# Patient Record
Sex: MALE | Race: WHITE | NOT HISPANIC OR LATINO | Employment: OTHER | URBAN - METROPOLITAN AREA
[De-identification: names, ages, dates, MRNs, and addresses within clinical notes are randomized per-mention and may not be internally consistent; named-entity substitution may affect disease eponyms.]

---

## 2023-05-10 ENCOUNTER — LAB (OUTPATIENT)
Dept: LAB | Facility: LAB | Age: 88
End: 2023-05-10
Payer: MEDICARE

## 2023-05-10 ENCOUNTER — OFFICE VISIT (OUTPATIENT)
Dept: PRIMARY CARE | Facility: CLINIC | Age: 88
End: 2023-05-10
Payer: MEDICARE

## 2023-05-10 VITALS
OXYGEN SATURATION: 94 % | SYSTOLIC BLOOD PRESSURE: 112 MMHG | DIASTOLIC BLOOD PRESSURE: 48 MMHG | HEIGHT: 67 IN | HEART RATE: 67 BPM | WEIGHT: 165.5 LBS | BODY MASS INDEX: 25.98 KG/M2

## 2023-05-10 DIAGNOSIS — R53.83 FATIGUE, UNSPECIFIED TYPE: ICD-10-CM

## 2023-05-10 DIAGNOSIS — D64.9 ANEMIA, UNSPECIFIED TYPE: ICD-10-CM

## 2023-05-10 DIAGNOSIS — D69.6 THROMBOCYTOPENIA (CMS-HCC): ICD-10-CM

## 2023-05-10 DIAGNOSIS — H93.8X1 SENSATION OF PLUGGED EAR ON RIGHT SIDE: ICD-10-CM

## 2023-05-10 DIAGNOSIS — N18.31 STAGE 3A CHRONIC KIDNEY DISEASE (MULTI): ICD-10-CM

## 2023-05-10 DIAGNOSIS — R06.09 DOE (DYSPNEA ON EXERTION): Primary | ICD-10-CM

## 2023-05-10 DIAGNOSIS — R06.09 DOE (DYSPNEA ON EXERTION): ICD-10-CM

## 2023-05-10 DIAGNOSIS — R09.89 WIDENED PULSE PRESSURE: ICD-10-CM

## 2023-05-10 PROBLEM — R06.02 SOB (SHORTNESS OF BREATH) ON EXERTION: Status: ACTIVE | Noted: 2023-05-10

## 2023-05-10 PROBLEM — J44.9 CHRONIC OBSTRUCTIVE LUNG DISEASE (MULTI): Status: ACTIVE | Noted: 2022-08-01

## 2023-05-10 PROBLEM — R91.1 LUNG NODULE: Status: ACTIVE | Noted: 2023-05-10

## 2023-05-10 PROBLEM — N18.30 STAGE 3 CHRONIC KIDNEY DISEASE (MULTI): Status: ACTIVE | Noted: 2022-09-07

## 2023-05-10 PROBLEM — E78.00 HYPERCHOLESTEROLEMIA: Status: ACTIVE | Noted: 2022-08-01

## 2023-05-10 PROBLEM — I95.9 LOW BLOOD PRESSURE: Status: ACTIVE | Noted: 2022-09-07

## 2023-05-10 LAB
ERYTHROCYTE DISTRIBUTION WIDTH (RATIO) BY AUTOMATED COUNT: 15.3 % (ref 11.5–14.5)
ERYTHROCYTE MEAN CORPUSCULAR HEMOGLOBIN CONCENTRATION (G/DL) BY AUTOMATED: 30.6 G/DL (ref 32–36)
ERYTHROCYTE MEAN CORPUSCULAR VOLUME (FL) BY AUTOMATED COUNT: 96 FL (ref 80–100)
ERYTHROCYTES (10*6/UL) IN BLOOD BY AUTOMATED COUNT: 3.75 X10E12/L (ref 4.5–5.9)
HEMATOCRIT (%) IN BLOOD BY AUTOMATED COUNT: 35.9 % (ref 41–52)
HEMOGLOBIN (G/DL) IN BLOOD: 11 G/DL (ref 13.5–17.5)
LEUKOCYTES (10*3/UL) IN BLOOD BY AUTOMATED COUNT: 15.9 X10E9/L (ref 4.4–11.3)
NATRIURETIC PEPTIDE B (PG/ML) IN SER/PLAS: 254 PG/ML (ref 0–99)
NRBC (PER 100 WBCS) BY AUTOMATED COUNT: 0.1 /100 WBC (ref 0–0)
PLATELETS (10*3/UL) IN BLOOD AUTOMATED COUNT: 122 X10E9/L (ref 150–450)
POC ALBUMIN /CREATININE RATIO MANUALLY ENTERED: ABNORMAL UG/MG CREAT
POC URINE ALBUMIN: 80 MG/L
POC URINE CREATININE: 100 MG/DL
THYROTROPIN (MIU/L) IN SER/PLAS BY DETECTION LIMIT <= 0.05 MIU/L: 4.15 MIU/L (ref 0.44–3.98)

## 2023-05-10 PROCEDURE — 99203 OFFICE O/P NEW LOW 30 MIN: CPT | Performed by: FAMILY MEDICINE

## 2023-05-10 PROCEDURE — 1036F TOBACCO NON-USER: CPT | Performed by: FAMILY MEDICINE

## 2023-05-10 PROCEDURE — 83550 IRON BINDING TEST: CPT

## 2023-05-10 PROCEDURE — 82607 VITAMIN B-12: CPT

## 2023-05-10 PROCEDURE — 83540 ASSAY OF IRON: CPT

## 2023-05-10 PROCEDURE — 83735 ASSAY OF MAGNESIUM: CPT

## 2023-05-10 PROCEDURE — 84439 ASSAY OF FREE THYROXINE: CPT

## 2023-05-10 PROCEDURE — 83880 ASSAY OF NATRIURETIC PEPTIDE: CPT

## 2023-05-10 PROCEDURE — 85027 COMPLETE CBC AUTOMATED: CPT

## 2023-05-10 PROCEDURE — 82728 ASSAY OF FERRITIN: CPT

## 2023-05-10 PROCEDURE — 84443 ASSAY THYROID STIM HORMONE: CPT

## 2023-05-10 PROCEDURE — 93000 ELECTROCARDIOGRAM COMPLETE: CPT | Performed by: FAMILY MEDICINE

## 2023-05-10 PROCEDURE — 1159F MED LIST DOCD IN RCRD: CPT | Performed by: FAMILY MEDICINE

## 2023-05-10 PROCEDURE — 36415 COLL VENOUS BLD VENIPUNCTURE: CPT

## 2023-05-10 PROCEDURE — 82044 UR ALBUMIN SEMIQUANTITATIVE: CPT | Performed by: FAMILY MEDICINE

## 2023-05-10 PROCEDURE — 80053 COMPREHEN METABOLIC PANEL: CPT

## 2023-05-10 RX ORDER — FERROUS SULFATE 325(65) MG
65 TABLET ORAL
COMMUNITY
End: 2023-11-21 | Stop reason: ALTCHOICE

## 2023-05-10 RX ORDER — TAMSULOSIN HYDROCHLORIDE 0.4 MG/1
CAPSULE ORAL
COMMUNITY
Start: 2021-09-28

## 2023-05-10 RX ORDER — MV-MN/OM3/DHA/EPA/FISH/LUT/ZEA 250-5-1 MG
CAPSULE ORAL
COMMUNITY
Start: 2021-09-28

## 2023-05-10 RX ORDER — VIT C/E/ZN/COPPR/LUTEIN/ZEAXAN 250MG-90MG
CAPSULE ORAL
COMMUNITY
Start: 2021-09-28 | End: 2023-11-21 | Stop reason: ALTCHOICE

## 2023-05-10 RX ORDER — YEAST,DRIED (S. CEREVISIAE)
POWDER (GRAM) ORAL
COMMUNITY
Start: 2021-09-28

## 2023-05-10 RX ORDER — SIMVASTATIN 20 MG/1
TABLET, FILM COATED ORAL
COMMUNITY
Start: 2021-09-28

## 2023-05-10 ASSESSMENT — ENCOUNTER SYMPTOMS
WHEEZING: 0
SHORTNESS OF BREATH: 1
UNEXPECTED WEIGHT CHANGE: 0
DIFFICULTY URINATING: 0
DIARRHEA: 0
BLOOD IN STOOL: 0
DYSURIA: 0
NUMBNESS: 0
TROUBLE SWALLOWING: 0
VOMITING: 0
ABDOMINAL PAIN: 0
FEVER: 0
LIGHT-HEADEDNESS: 0
COUGH: 0
DIZZINESS: 0
CHILLS: 0
NAUSEA: 0
CONFUSION: 0
WEAKNESS: 0

## 2023-05-10 NOTE — PROGRESS NOTES
"Subjective   Patient ID: Abdias Mike is a 89 y.o. male who presents for Weakness, Gen and Shortness of Breath.  HPI  C/o generalized weakness since knee surgery July 2022. Also c/o FRIAS, going on \"quite a while.\" Nothing's changed for quite a while.    Quit smoking 40yoa, smoked for 20 years.    [facility] Last summer started seeing Dr. Moses.     Saw a cardiologist 10y ago, RBBB?    Previously took Midodrine to try to increase DBP, just decided to stop it on his own.    Has an appointment with hematology on 8-.    C/o right ear clogging occasionally. Feels fine at the moment. H/o clogging, sometimes triggered by swallowing. Relieved by flushing out the ear (wax).       Review of Systems   Constitutional:  Negative for chills, fever and unexpected weight change.   HENT:  Negative for ear pain and trouble swallowing.    Respiratory:  Positive for shortness of breath (on exertion). Negative for cough and wheezing.    Cardiovascular:  Negative for chest pain.   Gastrointestinal:  Negative for abdominal pain, blood in stool, diarrhea, nausea and vomiting.   Genitourinary:  Negative for difficulty urinating and dysuria.   Skin:  Negative for rash.   Neurological:  Negative for dizziness, syncope, weakness, light-headedness and numbness.   Psychiatric/Behavioral:  Negative for behavioral problems and confusion.          Objective   BP (!) 112/48 Comment: maual  Pulse 67   Ht 1.702 m (5' 7\")   Wt 75.1 kg (165 lb 8 oz)   SpO2 94%   BMI 25.92 kg/m²     Physical Exam  Vitals and nursing note reviewed.   Constitutional:       General: He is not in acute distress.     Appearance: He is not diaphoretic.   HENT:      Head: Normocephalic and atraumatic.      Right Ear: Tympanic membrane, ear canal and external ear normal. There is no impacted cerumen.      Left Ear: Tympanic membrane, ear canal and external ear normal. There is no impacted cerumen.   Eyes:      General: No scleral icterus.     Conjunctiva/sclera: " Conjunctivae normal.   Neck:      Thyroid: No thyroid mass, thyromegaly or thyroid tenderness.      Vascular: No carotid bruit.      Trachea: No tracheal deviation.   Cardiovascular:      Rate and Rhythm: Normal rate and regular rhythm.      Heart sounds: Normal heart sounds.   Pulmonary:      Effort: Pulmonary effort is normal.      Breath sounds: Normal breath sounds. No wheezing, rhonchi or rales.   Musculoskeletal:      Cervical back: Normal range of motion and neck supple. No rigidity.   Lymphadenopathy:      Cervical: No cervical adenopathy.   Skin:     General: Skin is warm and dry.   Neurological:      General: No focal deficit present.      Mental Status: He is alert. Mental status is at baseline.   Psychiatric:         Mood and Affect: Mood normal.         Thought Content: Thought content normal.         Assessment/Plan   Problem List Items Addressed This Visit          Nervous    Sensation of plugged ear on right side     Asymptomatic currently. Likely due to cerumen.            Respiratory    FRIAS (dyspnea on exertion) - Primary     Check echocardiogram, blood counts, etc. Follow-up with cardiology.         Relevant Orders    Transthoracic echo (TTE) complete    TSH with reflex to Free T4 if abnormal    Magnesium    B-type natriuretic peptide    Referral to Cardiology    ECG 12 lead (Clinic Performed) (Completed)       Circulatory    Widened pulse pressure    Relevant Orders    Transthoracic echo (TTE) complete    Referral to Cardiology    ECG 12 lead (Clinic Performed) (Completed)       Genitourinary    Stage 3 chronic kidney disease (CMS/HCC)    Relevant Orders    Comprehensive Metabolic Panel    POCT microalbumin manually resulted (Completed)       Hematologic    Anemia     Recheck. Continue follow-up with hematology.         Relevant Orders    CBC    Vitamin B12    Iron and TIBC    Ferritin    Thrombocytopenia (CMS/HCC)     Recheck. Continue follow-up with hematology.            Other    Fatigue      Recheck CBC.         Relevant Orders    Comprehensive Metabolic Panel    CBC    TSH with reflex to Free T4 if abnormal    Referral to Cardiology

## 2023-05-11 ENCOUNTER — TELEPHONE (OUTPATIENT)
Dept: PRIMARY CARE | Facility: CLINIC | Age: 88
End: 2023-05-11
Payer: MEDICARE

## 2023-05-11 LAB
ALANINE AMINOTRANSFERASE (SGPT) (U/L) IN SER/PLAS: 12 U/L (ref 10–52)
ALBUMIN (G/DL) IN SER/PLAS: 3.9 G/DL (ref 3.4–5)
ALKALINE PHOSPHATASE (U/L) IN SER/PLAS: 72 U/L (ref 33–136)
ANION GAP IN SER/PLAS: 16 MMOL/L (ref 10–20)
ASPARTATE AMINOTRANSFERASE (SGOT) (U/L) IN SER/PLAS: 21 U/L (ref 9–39)
BILIRUBIN TOTAL (MG/DL) IN SER/PLAS: 0.5 MG/DL (ref 0–1.2)
CALCIUM (MG/DL) IN SER/PLAS: 9.3 MG/DL (ref 8.6–10.3)
CARBON DIOXIDE, TOTAL (MMOL/L) IN SER/PLAS: 25 MMOL/L (ref 21–32)
CHLORIDE (MMOL/L) IN SER/PLAS: 102 MMOL/L (ref 98–107)
COBALAMIN (VITAMIN B12) (PG/ML) IN SER/PLAS: 1338 PG/ML (ref 211–911)
CREATININE (MG/DL) IN SER/PLAS: 1.37 MG/DL (ref 0.5–1.3)
FERRITIN (UG/LL) IN SER/PLAS: 864 UG/L (ref 20–300)
GFR MALE: 49 ML/MIN/1.73M2
GLUCOSE (MG/DL) IN SER/PLAS: 92 MG/DL (ref 74–99)
IRON (UG/DL) IN SER/PLAS: 54 UG/DL (ref 35–150)
IRON BINDING CAPACITY (UG/DL) IN SER/PLAS: 257 UG/DL (ref 240–445)
IRON SATURATION (%) IN SER/PLAS: 21 % (ref 25–45)
MAGNESIUM (MG/DL) IN SER/PLAS: 2.38 MG/DL (ref 1.6–2.4)
POTASSIUM (MMOL/L) IN SER/PLAS: 4.1 MMOL/L (ref 3.5–5.3)
PROTEIN TOTAL: 7.1 G/DL (ref 6.4–8.2)
SODIUM (MMOL/L) IN SER/PLAS: 139 MMOL/L (ref 136–145)
THYROXINE (T4) FREE (NG/DL) IN SER/PLAS: 0.92 NG/DL (ref 0.61–1.12)
UREA NITROGEN (MG/DL) IN SER/PLAS: 28 MG/DL (ref 6–23)

## 2023-05-11 NOTE — TELEPHONE ENCOUNTER
Result Communication    Resulted Orders   POCT microalbumin manually resulted   Result Value Ref Range    POC Urine Albumin 80 No Reference Range Established mg/L      Comment:      100    POC Urine Creatinine 100 No Reference Range Established mg/dl    POC ALBUMIN /CREATININE RATIO MANUALLY ENTERED 30 - 300 (A) <30 ug/mg Creat       12:14 PM      Results were successfully communicated with the patient and they acknowledged their understanding. Pt has my chart and has viewed results

## 2023-05-11 NOTE — TELEPHONE ENCOUNTER
----- Message from Rafael Todd DO sent at 5/10/2023  4:30 PM EDT -----  Please make sure patient is aware of the comments or MyChart message.

## 2023-05-21 DIAGNOSIS — N18.31 STAGE 3A CHRONIC KIDNEY DISEASE (MULTI): ICD-10-CM

## 2023-05-21 DIAGNOSIS — D72.829 LEUKOCYTOSIS, UNSPECIFIED TYPE: ICD-10-CM

## 2023-05-21 DIAGNOSIS — R79.89 TSH ELEVATION: Primary | ICD-10-CM

## 2023-05-21 DIAGNOSIS — D50.9 IRON DEFICIENCY ANEMIA, UNSPECIFIED IRON DEFICIENCY ANEMIA TYPE: ICD-10-CM

## 2023-05-21 DIAGNOSIS — D72.821 MONOCYTOSIS: ICD-10-CM

## 2023-05-21 DIAGNOSIS — E78.00 HYPERCHOLESTEROLEMIA: ICD-10-CM

## 2023-05-22 ENCOUNTER — TELEPHONE (OUTPATIENT)
Dept: PRIMARY CARE | Facility: CLINIC | Age: 88
End: 2023-05-22
Payer: MEDICARE

## 2023-05-22 DIAGNOSIS — D64.9 ANEMIA, UNSPECIFIED TYPE: ICD-10-CM

## 2023-05-22 DIAGNOSIS — D72.821 MONOCYTOSIS: ICD-10-CM

## 2023-05-22 DIAGNOSIS — D69.6 THROMBOCYTOPENIA (CMS-HCC): Primary | ICD-10-CM

## 2023-05-22 NOTE — TELEPHONE ENCOUNTER
----- Message from Rafael Todd DO sent at 5/21/2023  1:14 PM EDT -----  Please make sure patient is aware of the comments or MyChart message.

## 2023-05-22 NOTE — TELEPHONE ENCOUNTER
Result Communication    Resulted Orders   Comprehensive Metabolic Panel   Result Value Ref Range    Glucose 92 74 - 99 mg/dL    Sodium 139 136 - 145 mmol/L    Potassium 4.1 3.5 - 5.3 mmol/L    Chloride 102 98 - 107 mmol/L    Bicarbonate 25 21 - 32 mmol/L    Anion Gap 16 10 - 20 mmol/L    Urea Nitrogen 28 (H) 6 - 23 mg/dL    Creatinine 1.37 (H) 0.50 - 1.30 mg/dL    GFR MALE 49 (A) >90 mL/min/1.73m2      Comment:       CALCULATIONS OF ESTIMATED GFR ARE PERFORMED   USING THE 2021 CKD-EPI STUDY REFIT EQUATION   WITHOUT THE RACE VARIABLE FOR THE IDMS-TRACEABLE   CREATININE METHODS.    https://jasn.asnjournals.org/content/early/2021/09/22/ASN.6521426023    Calcium 9.3 8.6 - 10.3 mg/dL    Albumin 3.9 3.4 - 5.0 g/dL    Alkaline Phosphatase 72 33 - 136 U/L    Total Protein 7.1 6.4 - 8.2 g/dL    AST 21 9 - 39 U/L    Total Bilirubin 0.5 0.0 - 1.2 mg/dL    ALT (SGPT) 12 10 - 52 U/L      Comment:       Patients treated with Sulfasalazine may generate    falsely decreased results for ALT.   CBC   Result Value Ref Range    WBC 15.9 (H) 4.4 - 11.3 x10E9/L    nRBC 0.1 0.0 - 0.0 /100 WBC    RBC 3.75 (L) 4.50 - 5.90 x10E12/L    Hemoglobin 11.0 (L) 13.5 - 17.5 g/dL    Hematocrit 35.9 (L) 41.0 - 52.0 %    MCV 96 80 - 100 fL    MCHC 30.6 (L) 32.0 - 36.0 g/dL    Platelets 122 (L) 150 - 450 x10E9/L    RDW 15.3 (H) 11.5 - 14.5 %   TSH with reflex to Free T4 if abnormal   Result Value Ref Range    TSH 4.15 (H) 0.44 - 3.98 mIU/L      Comment:       TSH testing is performed using different testing    methodology at [facility] than at other    [facility] hospitals. Direct result comparisons should    only be made within the same method.   Magnesium   Result Value Ref Range    Magnesium 2.38 1.60 - 2.40 mg/dL   B-type natriuretic peptide   Result Value Ref Range     (H) 0 - 99 pg/mL      Comment:      .  <100 pg/mL - Heart failure unlikely  100-299 pg/mL - Intermediate probability of acute heart  .               failure  exacerbation. Correlate with clinical  .               context and patient history.    >=300 pg/mL - Heart Failure likely. Correlate with clinical  .               context and patient history.  BNP testing is performed using different testing   methodology at [facility] than at other   [facility]. Direct result comparisons should   only be made within the same method.   Vitamin B12   Result Value Ref Range    Vitamin B-12 1338 (H) 211 - 911 pg/mL   Iron and TIBC   Result Value Ref Range    Iron 54 35 - 150 ug/dL    TIBC 257 240 - 445 ug/dL    Iron Saturation 21 (L) 25 - 45 %   Ferritin   Result Value Ref Range    Ferritin 864 (H) 20 - 300 ug/L   Thyroxine, Free   Result Value Ref Range    Free T4 0.92 0.61 - 1.12 ng/dL      Comment:       Thyroxine Free testing is performed using different testing    methodology at [facility] than at other    [facility]. Direct result comparisons should    only be made within the same method.  .   Biotin can cause falsely elevated free T4 results. Patients   taking a Biotin dose of up to 10 mg/day should refrain from   taking Biotin for 24 hours before sample collection. Patient   taking a Biotin dose of >10 mg/day should consult with their   physician or the laboratory before the blood draw.       2:00 PM      Results were successfully communicated with the patient and they acknowledged their understanding.  Result Communication    Resulted Orders   Comprehensive Metabolic Panel   Result Value Ref Range    Glucose 92 74 - 99 mg/dL    Sodium 139 136 - 145 mmol/L    Potassium 4.1 3.5 - 5.3 mmol/L    Chloride 102 98 - 107 mmol/L    Bicarbonate 25 21 - 32 mmol/L    Anion Gap 16 10 - 20 mmol/L    Urea Nitrogen 28 (H) 6 - 23 mg/dL    Creatinine 1.37 (H) 0.50 - 1.30 mg/dL    GFR MALE 49 (A) >90 mL/min/1.73m2      Comment:       CALCULATIONS OF ESTIMATED GFR ARE PERFORMED   USING THE 2021 CKD-EPI STUDY REFIT EQUATION   WITHOUT THE RACE VARIABLE FOR  THE IDMS-TRACEABLE   CREATININE METHODS.    https://jasn.asnjournals.org/content/early/2021/09/22/ASN.5424850621    Calcium 9.3 8.6 - 10.3 mg/dL    Albumin 3.9 3.4 - 5.0 g/dL    Alkaline Phosphatase 72 33 - 136 U/L    Total Protein 7.1 6.4 - 8.2 g/dL    AST 21 9 - 39 U/L    Total Bilirubin 0.5 0.0 - 1.2 mg/dL    ALT (SGPT) 12 10 - 52 U/L      Comment:       Patients treated with Sulfasalazine may generate    falsely decreased results for ALT.   CBC   Result Value Ref Range    WBC 15.9 (H) 4.4 - 11.3 x10E9/L    nRBC 0.1 0.0 - 0.0 /100 WBC    RBC 3.75 (L) 4.50 - 5.90 x10E12/L    Hemoglobin 11.0 (L) 13.5 - 17.5 g/dL    Hematocrit 35.9 (L) 41.0 - 52.0 %    MCV 96 80 - 100 fL    MCHC 30.6 (L) 32.0 - 36.0 g/dL    Platelets 122 (L) 150 - 450 x10E9/L    RDW 15.3 (H) 11.5 - 14.5 %   TSH with reflex to Free T4 if abnormal   Result Value Ref Range    TSH 4.15 (H) 0.44 - 3.98 mIU/L      Comment:       TSH testing is performed using different testing    methodology at [facility] than at other    [facility]. Direct result comparisons should    only be made within the same method.   Magnesium   Result Value Ref Range    Magnesium 2.38 1.60 - 2.40 mg/dL   B-type natriuretic peptide   Result Value Ref Range     (H) 0 - 99 pg/mL      Comment:      .  <100 pg/mL - Heart failure unlikely  100-299 pg/mL - Intermediate probability of acute heart  .               failure exacerbation. Correlate with clinical  .               context and patient history.    >=300 pg/mL - Heart Failure likely. Correlate with clinical  .               context and patient history.  BNP testing is performed using different testing   methodology at [facility] than at other   system hospitals. Direct result comparisons should   only be made within the same method.   Vitamin B12   Result Value Ref Range    Vitamin B-12 1338 (H) 211 - 911 pg/mL   Iron and TIBC   Result Value Ref Range    Iron 54 35 - 150 ug/dL    TIBC 257  240 - 445 ug/dL    Iron Saturation 21 (L) 25 - 45 %   Ferritin   Result Value Ref Range    Ferritin 864 (H) 20 - 300 ug/L   Thyroxine, Free   Result Value Ref Range    Free T4 0.92 0.61 - 1.12 ng/dL      Comment:       Thyroxine Free testing is performed using different testing    methodology at [facility] than at other    [facility]. Direct result comparisons should    only be made within the same method.  .   Biotin can cause falsely elevated free T4 results. Patients   taking a Biotin dose of up to 10 mg/day should refrain from   taking Biotin for 24 hours before sample collection. Patient   taking a Biotin dose of >10 mg/day should consult with their   physician or the laboratory before the blood draw.       2:00 PM      Results were successfully communicated with the patient and they acknowledged their understanding.

## 2023-08-15 ENCOUNTER — TELEPHONE (OUTPATIENT)
Dept: PRIMARY CARE | Facility: CLINIC | Age: 88
End: 2023-08-15
Payer: MEDICARE

## 2023-08-15 DIAGNOSIS — E55.9 VITAMIN D DEFICIENCY: Primary | ICD-10-CM

## 2023-08-15 NOTE — TELEPHONE ENCOUNTER
Pts wife is asking if an order for a Vitamin D level can be added to the order that's already entered to be done before his appt. On 8/25/2023

## 2023-08-16 ENCOUNTER — APPOINTMENT (OUTPATIENT)
Dept: PRIMARY CARE | Facility: CLINIC | Age: 88
End: 2023-08-16
Payer: MEDICARE

## 2023-08-18 RX ORDER — MV-MIN/FOLIC/K1/LYCOPEN/LUTEIN 300-60 MCG
1 TABLET ORAL DAILY
COMMUNITY
Start: 2021-09-28 | End: 2023-11-21 | Stop reason: ALTCHOICE

## 2023-08-18 RX ORDER — NAPROXEN SODIUM 220 MG/1
TABLET, FILM COATED ORAL
COMMUNITY
End: 2023-11-21 | Stop reason: ALTCHOICE

## 2023-08-21 ENCOUNTER — LAB (OUTPATIENT)
Dept: LAB | Facility: LAB | Age: 88
End: 2023-08-21
Payer: MEDICARE

## 2023-08-21 DIAGNOSIS — D50.9 IRON DEFICIENCY ANEMIA, UNSPECIFIED IRON DEFICIENCY ANEMIA TYPE: ICD-10-CM

## 2023-08-21 DIAGNOSIS — D72.821 MONOCYTOSIS: ICD-10-CM

## 2023-08-21 DIAGNOSIS — E78.00 HYPERCHOLESTEROLEMIA: ICD-10-CM

## 2023-08-21 DIAGNOSIS — N18.31 STAGE 3A CHRONIC KIDNEY DISEASE (MULTI): ICD-10-CM

## 2023-08-21 DIAGNOSIS — E55.9 VITAMIN D DEFICIENCY: ICD-10-CM

## 2023-08-21 DIAGNOSIS — D72.829 LEUKOCYTOSIS, UNSPECIFIED TYPE: ICD-10-CM

## 2023-08-21 DIAGNOSIS — R79.89 TSH ELEVATION: ICD-10-CM

## 2023-08-21 LAB
ERYTHROCYTE DISTRIBUTION WIDTH (RATIO) BY AUTOMATED COUNT: 15.2 % (ref 11.5–14.5)
ERYTHROCYTE MEAN CORPUSCULAR HEMOGLOBIN CONCENTRATION (G/DL) BY AUTOMATED: 30.7 G/DL (ref 32–36)
ERYTHROCYTE MEAN CORPUSCULAR VOLUME (FL) BY AUTOMATED COUNT: 96 FL (ref 80–100)
ERYTHROCYTES (10*6/UL) IN BLOOD BY AUTOMATED COUNT: 4.05 X10E12/L (ref 4.5–5.9)
HEMATOCRIT (%) IN BLOOD BY AUTOMATED COUNT: 38.7 % (ref 41–52)
HEMOGLOBIN (G/DL) IN BLOOD: 11.9 G/DL (ref 13.5–17.5)
IMMATURE GRANULOCYTES/100 LEUKOCYTES IN BLOOD BY AUTOMATED COUNT: 6.1 % (ref 0–0.9)
LEUKOCYTES (10*3/UL) IN BLOOD BY AUTOMATED COUNT: 14.2 X10E9/L (ref 4.4–11.3)
MANUAL DIFFERENTIAL Y/N: ABNORMAL
NRBC (PER 100 WBCS) BY AUTOMATED COUNT: 0 /100 WBC (ref 0–0)
PLATELETS (10*3/UL) IN BLOOD AUTOMATED COUNT: 94 X10E9/L (ref 150–450)

## 2023-08-21 PROCEDURE — 36415 COLL VENOUS BLD VENIPUNCTURE: CPT

## 2023-08-21 PROCEDURE — 82652 VIT D 1 25-DIHYDROXY: CPT

## 2023-08-21 PROCEDURE — 84443 ASSAY THYROID STIM HORMONE: CPT

## 2023-08-21 PROCEDURE — 85025 COMPLETE CBC W/AUTO DIFF WBC: CPT

## 2023-08-21 PROCEDURE — 84439 ASSAY OF FREE THYROXINE: CPT

## 2023-08-21 PROCEDURE — 80048 BASIC METABOLIC PNL TOTAL CA: CPT

## 2023-08-22 LAB
ANION GAP IN SER/PLAS: 15 MMOL/L (ref 10–20)
BAND NEUTROPHILS (10*3/UL) BLOOD MANUAL COUNT - WAM: 0.24 X10E9/L (ref 0–0.5)
BURR CELLS PRESENCE IN BLOOD BY LIGHT MICROSCOPY: NORMAL
CALCIUM (MG/DL) IN SER/PLAS: 9.8 MG/DL (ref 8.6–10.6)
CARBON DIOXIDE, TOTAL (MMOL/L) IN SER/PLAS: 23 MMOL/L (ref 21–32)
CHLORIDE (MMOL/L) IN SER/PLAS: 103 MMOL/L (ref 98–107)
CREATININE (MG/DL) IN SER/PLAS: 1.47 MG/DL (ref 0.5–1.3)
EOSINOPHILS (10*3/UL) IN BLOOD BY MANUAL COUNT - WAM: 0.24 X10E9/L (ref 0–0.4)
EOSINOPHILS/100 LEUKOCYTES IN BLOOD BY MANUAL COUNT - WAM: 1.7 % (ref 0–6)
GFR MALE: 45 ML/MIN/1.73M2
GLUCOSE (MG/DL) IN SER/PLAS: 85 MG/DL (ref 74–99)
LYMPHOCYTES (10*3/UL) IN BLOOD BY MANUAL COUNT - WAM: 1.76 X10E9/L (ref 0.8–3)
LYMPHOCYTES/100 LEUKOCYTES IN BLOOD BY MANUAL COUNT - WAM: 12.4 % (ref 13–44)
MONOCYTES (10*3/UL) IN BLOOD BY MANUAL COUNT - WAM: 2.81 X10E9/L (ref 0.05–0.8)
MONOCYTES/100 LEUKOCYTES IN BLOOD BY MANUAL COUNT - WAM: 19.8 % (ref 2–10)
MYELOCYTES (10*3/UL) IN BLOOD BY MANUAL COUNT - WAM: 0.11 X10E9/L (ref 0–0)
MYELOCYTES/100 LEUKOCYTES IN BLOOD BY MANUAL COUNT - WAM: 0.8 % (ref 0–0)
NEUTROPHILS (SEGS+BANDS) (10*3/UL) MANUAL COUNT - WAM: 9.27 X10E9/L (ref 1.6–5.5)
NEUTROPHILS BAND FORM/100 LEUKOCYTES IN BLOOD BY MANUAL COUNT - WAM: 1.7 % (ref 0–5)
POTASSIUM (MMOL/L) IN SER/PLAS: 3.8 MMOL/L (ref 3.5–5.3)
RBC MORPHOLOGY IN BLOOD: NORMAL
SEGMENTED NEUTROPHILS (10*3/UL) BLOOD MANUAL - WAM: 9.03 X10E9/L (ref 1.6–5)
SEGMENTED NEUTROPHILS/100 LEUKOCYTES BY MANUAL COUNT -: 63.6 % (ref 40–80)
SODIUM (MMOL/L) IN SER/PLAS: 137 MMOL/L (ref 136–145)
THYROTROPIN (MIU/L) IN SER/PLAS BY DETECTION LIMIT <= 0.05 MIU/L: 4.74 MIU/L (ref 0.44–3.98)
THYROXINE (T4) FREE (NG/DL) IN SER/PLAS: 1.16 NG/DL (ref 0.78–1.48)
UREA NITROGEN (MG/DL) IN SER/PLAS: 24 MG/DL (ref 6–23)

## 2023-08-24 LAB — VITAMIN D 1,25-DIHYDROXY: 27.1 PG/ML (ref 19.9–79.3)

## 2023-08-25 ENCOUNTER — TELEPHONE (OUTPATIENT)
Dept: PRIMARY CARE | Facility: CLINIC | Age: 88
End: 2023-08-25

## 2023-08-25 DIAGNOSIS — E78.00 HYPERCHOLESTEROLEMIA: ICD-10-CM

## 2023-08-25 DIAGNOSIS — R79.89 TSH ELEVATION: Primary | ICD-10-CM

## 2023-08-25 NOTE — TELEPHONE ENCOUNTER
Result Communication    Resulted Orders   Basic Metabolic Panel   Result Value Ref Range    Glucose 85 74 - 99 mg/dL    Sodium 137 136 - 145 mmol/L    Potassium 3.8 3.5 - 5.3 mmol/L    Chloride 103 98 - 107 mmol/L    Bicarbonate 23 21 - 32 mmol/L    Anion Gap 15 10 - 20 mmol/L    Urea Nitrogen 24 (H) 6 - 23 mg/dL    Creatinine 1.47 (H) 0.50 - 1.30 mg/dL    GFR MALE 45 (A) >90 mL/min/1.73m2      Comment:       CALCULATIONS OF ESTIMATED GFR ARE PERFORMED   USING THE 2021 CKD-EPI STUDY REFIT EQUATION   WITHOUT THE RACE VARIABLE FOR THE IDMS-TRACEABLE   CREATININE METHODS.    https://jasn.asnjournals.org/content/early/2021/09/22/ASN.4818125550    Calcium 9.8 8.6 - 10.6 mg/dL   CBC and Auto Differential   Result Value Ref Range    WBC 14.2 (H) 4.4 - 11.3 x10E9/L    nRBC 0.0 0.0 - 0.0 /100 WBC    RBC 4.05 (L) 4.50 - 5.90 x10E12/L    Hemoglobin 11.9 (L) 13.5 - 17.5 g/dL    Hematocrit 38.7 (L) 41.0 - 52.0 %    MCV 96 80 - 100 fL    MCHC 30.7 (L) 32.0 - 36.0 g/dL    Platelets 94 (L) 150 - 450 x10E9/L    RDW 15.2 (H) 11.5 - 14.5 %    Immature Granulocytes %, Automated 6.1 (H) 0.0 - 0.9 %      Comment:       Immature Granulocyte Count (IG) includes promyelocytes,    myelocytes and metamyelocytes but does not include bands.   Percent differential counts (%) should be interpreted in the   context of the absolute cell counts (cells/L).    MANUAL DIFFERENTIAL Y/N SEE MANUAL DIFF       Comment:      This is a corrected result. Previous value was DNR, verified at 08/21/2023   18:07   TSH with reflex to Free T4 if abnormal   Result Value Ref Range    TSH 4.74 (H) 0.44 - 3.98 mIU/L      Comment:       TSH testing is performed using different testing    methodology at [facility] than at other    [facility] hospitals. Direct result comparisons should    only be made within the same method.   Manual Differential   Result Value Ref Range    Neutrophils % 63.6 40.0 - 80.0 %      Comment:       Percent differential counts (%)  should be interpreted in the   context of the absolute cell counts (cells/L).    Bands % 1.7 0.0 - 5.0 %    Lymphocytes % 12.4 13.0 - 44.0 %    Monocytes % 19.8 2.0 - 10.0 %    Eosinophils % 1.7 0.0 - 6.0 %    Myelocytes Relative 0.8 0.0 - 0.0 %    Absolute Neutrophil Count 9.27 (H) 1.60 - 5.50 x10E9/L    Segs Absolute 9.03 (H) 1.60 - 5.00 x10E9/L    Bands Absolute 0.24 0.00 - 0.50 x10E9/L    Lymphocytes Absolute 1.76 0.80 - 3.00 x10E9/L    Monocytes Absolute 2.81 (H) 0.05 - 0.80 x10E9/L    Eosinophils Absolute 0.24 0.00 - 0.40 x10E9/L    Myelocytes Absolute 0.11 (A) 0.00 - 0.00 x10E9/L   Morphology   Result Value Ref Range    RBC Morphology SEE BELOW     Lipscomb Cells FEW    Thyroxine, Free   Result Value Ref Range    Free T4 1.16 0.78 - 1.48 ng/dL      Comment:       Thyroxine Free testing is performed using different testing    methodology at [facility] than at other    [facility] hospitals. Direct result comparisons should    only be made within the same method.       1:16 PM      Results were successfully communicated with the patient and they acknowledged their understanding.

## 2023-08-29 ENCOUNTER — LAB (OUTPATIENT)
Dept: LAB | Facility: LAB | Age: 88
End: 2023-08-29
Payer: MEDICARE

## 2023-08-29 DIAGNOSIS — R79.89 TSH ELEVATION: ICD-10-CM

## 2023-08-29 LAB
THYROPEROXIDASE AB (IU/ML) IN SER/PLAS: <28 IU/ML
TRIIODOTHYRONINE (T3) FREE (PG/ML) IN SER/PLAS: 3.2 PG/ML (ref 2.3–4.2)

## 2023-08-29 PROCEDURE — 86376 MICROSOMAL ANTIBODY EACH: CPT

## 2023-08-29 PROCEDURE — 36415 COLL VENOUS BLD VENIPUNCTURE: CPT

## 2023-08-29 PROCEDURE — 84481 FREE ASSAY (FT-3): CPT

## 2023-09-12 ENCOUNTER — HOSPITAL ENCOUNTER (OUTPATIENT)
Dept: DATA CONVERSION | Facility: HOSPITAL | Age: 88
End: 2023-09-12
Attending: RADIOLOGY | Admitting: RADIOLOGY
Payer: MEDICARE

## 2023-09-12 DIAGNOSIS — D46.9 MYELODYSPLASTIC SYNDROME, UNSPECIFIED (MULTI): ICD-10-CM

## 2023-09-12 DIAGNOSIS — C93.10 CHRONIC MYELOMONOCYTIC LEUKEMIA NOT HAVING ACHIEVED REMISSION (MULTI): ICD-10-CM

## 2023-09-17 LAB
AANTI: NORMAL
BDESC: NORMAL
BPERC: 0.2 %
CCOLL: NORMAL PER TUBE
CCOUN: 104.05 X10E9/L
COMPLETE PATHOLOGY REPORT: NORMAL
CONVERTED ADDENDUM DIAGNOSIS 2: NORMAL
CONVERTED CLINICAL DIAGNOSIS-HISTORY: NORMAL
CONVERTED FINAL DIAGNOSIS: NORMAL
CONVERTED FINAL REPORT PDF LINK TO COPY AND PASTE: NORMAL
CONVERTED GROSS DESCRIPTION: NORMAL
CONVERTED MICROSCOPIC DESCRIPTION: NORMAL
FCTOR: NORMAL
FCTSO: NORMAL
FSITE: NORMAL
GDESC: NORMAL
GPERC: 75 %
LCD19: 6 % OF LYMPH
LCD4: 22 % OF LYMPH
LCD8: 57 % OF LYMPH
LGPD1: NORMAL
LGPNO: NORMAL
LNK: 15 % OF LYMPH
LPERC: 1 %
MDESC: NORMAL
MPERC: 9 %
PV194: NORMAL
VIAB: NORMAL

## 2023-09-21 LAB
GENETICS TEST NAME-DATA CONVERSION: NORMAL
LAB MOLECULAR CA TECHNICAL NOTES: NORMAL

## 2023-09-22 LAB
FISH ISCN RESULTS: NORMAL

## 2023-09-29 VITALS — BODY MASS INDEX: 25.61 KG/M2 | HEIGHT: 67 IN | WEIGHT: 163.14 LBS

## 2023-10-20 ENCOUNTER — TELEPHONE (OUTPATIENT)
Dept: HEMATOLOGY/ONCOLOGY | Facility: CLINIC | Age: 88
End: 2023-10-20
Payer: MEDICARE

## 2023-10-20 NOTE — TELEPHONE ENCOUNTER
Called and spoke with patient and his wife. Wife stated the bump on his left chest is about as big as a dime and is tender to touch. Dr Cheng was made aware and encouraged patient to go to ER since they are currently in [place] and are not returning until the beginning of November. Wife and patient verbalized understanding.

## 2023-10-24 LAB — CYTOGENETICS INTERPRETATION: NORMAL

## 2023-11-03 ENCOUNTER — LAB (OUTPATIENT)
Dept: LAB | Facility: HOSPITAL | Age: 88
End: 2023-11-03
Payer: MEDICARE

## 2023-11-03 DIAGNOSIS — D64.9 ANEMIA, UNSPECIFIED TYPE: ICD-10-CM

## 2023-11-03 DIAGNOSIS — D72.821 MONOCYTOSIS: ICD-10-CM

## 2023-11-03 DIAGNOSIS — D69.6 THROMBOCYTOPENIA (CMS-HCC): Primary | ICD-10-CM

## 2023-11-03 DIAGNOSIS — D69.6 THROMBOCYTOPENIA (CMS-HCC): ICD-10-CM

## 2023-11-03 LAB
ALBUMIN SERPL BCP-MCNC: 4.1 G/DL (ref 3.4–5)
ALP SERPL-CCNC: 62 U/L (ref 33–136)
ALT SERPL W P-5'-P-CCNC: 11 U/L (ref 10–52)
ANION GAP SERPL CALC-SCNC: 15 MMOL/L (ref 10–20)
AST SERPL W P-5'-P-CCNC: 20 U/L (ref 9–39)
BASOPHILS # BLD MANUAL: 0 X10*3/UL (ref 0–0.1)
BASOPHILS NFR BLD MANUAL: 0 %
BILIRUB SERPL-MCNC: 0.6 MG/DL (ref 0–1.2)
BUN SERPL-MCNC: 22 MG/DL (ref 6–23)
CALCIUM SERPL-MCNC: 8.7 MG/DL (ref 8.6–10.3)
CHLORIDE SERPL-SCNC: 99 MMOL/L (ref 98–107)
CO2 SERPL-SCNC: 25 MMOL/L (ref 21–32)
CREAT SERPL-MCNC: 1.54 MG/DL (ref 0.5–1.3)
EOSINOPHIL # BLD MANUAL: 0.14 X10*3/UL (ref 0–0.4)
EOSINOPHIL NFR BLD MANUAL: 1 %
ERYTHROCYTE [DISTWIDTH] IN BLOOD BY AUTOMATED COUNT: 15 % (ref 11.5–14.5)
GFR SERPL CREATININE-BSD FRML MDRD: 42 ML/MIN/1.73M*2
GLUCOSE SERPL-MCNC: 128 MG/DL (ref 74–99)
HCT VFR BLD AUTO: 33.7 % (ref 41–52)
HGB BLD-MCNC: 10.6 G/DL (ref 13.5–17.5)
IMM GRANULOCYTES # BLD AUTO: 0.75 X10*3/UL (ref 0–0.5)
IMM GRANULOCYTES NFR BLD AUTO: 5.2 % (ref 0–0.9)
LYMPHOCYTES # BLD MANUAL: 2.15 X10*3/UL (ref 0.8–3)
LYMPHOCYTES NFR BLD MANUAL: 15 %
MCH RBC QN AUTO: 29.5 PG (ref 26–34)
MCHC RBC AUTO-ENTMCNC: 31.5 G/DL (ref 32–36)
MCV RBC AUTO: 94 FL (ref 80–100)
METAMYELOCYTES # BLD MANUAL: 0.14 X10*3/UL
METAMYELOCYTES NFR BLD MANUAL: 1 %
MONOCYTES # BLD MANUAL: 3.58 X10*3/UL (ref 0.05–0.8)
MONOCYTES NFR BLD MANUAL: 25 %
NEUTROPHILS # BLD MANUAL: 8.29 X10*3/UL (ref 1.6–5.5)
NEUTS BAND # BLD MANUAL: 0.57 X10*3/UL (ref 0–0.5)
NEUTS BAND NFR BLD MANUAL: 4 %
NEUTS SEG # BLD MANUAL: 7.72 X10*3/UL (ref 1.6–5)
NEUTS SEG NFR BLD MANUAL: 54 %
NRBC BLD-RTO: 0 /100 WBCS (ref 0–0)
PLATELET # BLD AUTO: 93 X10*3/UL (ref 150–450)
POLYCHROMASIA BLD QL SMEAR: ABNORMAL
POTASSIUM SERPL-SCNC: 3.6 MMOL/L (ref 3.5–5.3)
PROT SERPL-MCNC: 7 G/DL (ref 6.4–8.2)
RBC # BLD AUTO: 3.59 X10*6/UL (ref 4.5–5.9)
RBC MORPH BLD: ABNORMAL
SODIUM SERPL-SCNC: 135 MMOL/L (ref 136–145)
TOTAL CELLS COUNTED BLD: 100
WBC # BLD AUTO: 14.3 X10*3/UL (ref 4.4–11.3)

## 2023-11-03 PROCEDURE — 80053 COMPREHEN METABOLIC PANEL: CPT

## 2023-11-03 PROCEDURE — 85007 BL SMEAR W/DIFF WBC COUNT: CPT

## 2023-11-03 PROCEDURE — 85007 BL SMEAR W/DIFF WBC COUNT: CPT | Performed by: INTERNAL MEDICINE

## 2023-11-03 PROCEDURE — 36415 COLL VENOUS BLD VENIPUNCTURE: CPT

## 2023-11-03 PROCEDURE — 85027 COMPLETE CBC AUTOMATED: CPT | Performed by: INTERNAL MEDICINE

## 2023-11-03 PROCEDURE — 85027 COMPLETE CBC AUTOMATED: CPT

## 2023-11-06 ENCOUNTER — TELEPHONE (OUTPATIENT)
Dept: HEMATOLOGY/ONCOLOGY | Facility: CLINIC | Age: 88
End: 2023-11-06
Payer: MEDICARE

## 2023-11-06 NOTE — TELEPHONE ENCOUNTER
Attempted to call Disha back but was unable to reach her to discuss, left VM with our office contact information and encouraged her to call the office back to discuss.

## 2023-11-08 NOTE — TELEPHONE ENCOUNTER
Called and spoke to pt' s wife, Jyoti.  She states pt has had this lump since July.  They had called last month and they did not go to ER as advised and they felt pt has had this since July and they would be coming home in 2 weeks so they did not want to start treatment in [place]. She calls today saying sometimes this spot is sometimes uncomfortable when he hits it or rolls onto it.  Advised to go to PCP (which they do have) and be evaluated.  Told we can see if PCP advises.  She was agreeable with this plan.  She states she will call PCP and try to get pt in soon.

## 2023-11-24 ENCOUNTER — LAB (OUTPATIENT)
Dept: LAB | Facility: LAB | Age: 88
End: 2023-11-24
Payer: MEDICARE

## 2023-11-24 DIAGNOSIS — E78.00 HYPERCHOLESTEROLEMIA: ICD-10-CM

## 2023-11-24 DIAGNOSIS — R79.89 TSH ELEVATION: ICD-10-CM

## 2023-11-24 DIAGNOSIS — N63.0 BREAST MASS IN MALE: ICD-10-CM

## 2023-11-24 LAB
T4 FREE SERPL-MCNC: 0.78 NG/DL (ref 0.61–1.12)
TSH SERPL-ACNC: 5.31 MIU/L (ref 0.44–3.98)

## 2023-11-24 PROCEDURE — 36415 COLL VENOUS BLD VENIPUNCTURE: CPT

## 2023-11-24 PROCEDURE — 84403 ASSAY OF TOTAL TESTOSTERONE: CPT

## 2023-11-24 PROCEDURE — 84443 ASSAY THYROID STIM HORMONE: CPT

## 2023-11-24 PROCEDURE — 83002 ASSAY OF GONADOTROPIN (LH): CPT

## 2023-11-24 PROCEDURE — 84439 ASSAY OF FREE THYROXINE: CPT

## 2023-11-27 ENCOUNTER — OFFICE VISIT (OUTPATIENT)
Dept: PRIMARY CARE | Facility: CLINIC | Age: 88
End: 2023-11-27
Payer: MEDICARE

## 2023-11-27 VITALS
HEIGHT: 67 IN | WEIGHT: 164 LBS | OXYGEN SATURATION: 94 % | DIASTOLIC BLOOD PRESSURE: 53 MMHG | HEART RATE: 64 BPM | BODY MASS INDEX: 25.74 KG/M2 | SYSTOLIC BLOOD PRESSURE: 109 MMHG

## 2023-11-27 DIAGNOSIS — Z23 NEED FOR VACCINATION: ICD-10-CM

## 2023-11-27 DIAGNOSIS — N64.4 BREAST TENDERNESS IN MALE: ICD-10-CM

## 2023-11-27 DIAGNOSIS — R79.89 TSH ELEVATION: ICD-10-CM

## 2023-11-27 DIAGNOSIS — E78.00 HYPERCHOLESTEROLEMIA: ICD-10-CM

## 2023-11-27 DIAGNOSIS — Z00.00 MEDICARE ANNUAL WELLNESS VISIT, SUBSEQUENT: Primary | ICD-10-CM

## 2023-11-27 DIAGNOSIS — R39.83 UNILATERAL NON-PALPABLE TESTICLE: ICD-10-CM

## 2023-11-27 DIAGNOSIS — N63.0 BREAST MASS IN MALE: ICD-10-CM

## 2023-11-27 PROCEDURE — 1036F TOBACCO NON-USER: CPT | Performed by: FAMILY MEDICINE

## 2023-11-27 PROCEDURE — G0439 PPPS, SUBSEQ VISIT: HCPCS | Performed by: FAMILY MEDICINE

## 2023-11-27 PROCEDURE — 1159F MED LIST DOCD IN RCRD: CPT | Performed by: FAMILY MEDICINE

## 2023-11-27 PROCEDURE — 90662 IIV NO PRSV INCREASED AG IM: CPT | Performed by: FAMILY MEDICINE

## 2023-11-27 PROCEDURE — G0008 ADMIN INFLUENZA VIRUS VAC: HCPCS | Performed by: FAMILY MEDICINE

## 2023-11-27 ASSESSMENT — ENCOUNTER SYMPTOMS
DIFFICULTY URINATING: 0
DIARRHEA: 0
WHEEZING: 0
DEPRESSION: 0
ABDOMINAL PAIN: 0
TROUBLE SWALLOWING: 0
NUMBNESS: 0
BLOOD IN STOOL: 0
DIZZINESS: 0
DYSURIA: 0
NAUSEA: 0
UNEXPECTED WEIGHT CHANGE: 0
WEAKNESS: 0
LOSS OF SENSATION IN FEET: 0
LIGHT-HEADEDNESS: 0
FEVER: 0
CONFUSION: 0
CHILLS: 0
OCCASIONAL FEELINGS OF UNSTEADINESS: 0
COUGH: 0
VOMITING: 0
SHORTNESS OF BREATH: 0

## 2023-11-27 ASSESSMENT — PATIENT HEALTH QUESTIONNAIRE - PHQ9
1. LITTLE INTEREST OR PLEASURE IN DOING THINGS: NOT AT ALL
SUM OF ALL RESPONSES TO PHQ9 QUESTIONS 1 AND 2: 0
2. FEELING DOWN, DEPRESSED OR HOPELESS: NOT AT ALL

## 2023-11-27 NOTE — PROGRESS NOTES
"Subjective   Reason for Visit: Abdias Mike is an 89 y.o. male here for a Medicare Wellness visit.          Reviewed all medications by prescribing practitioner or clinical pharmacist (such as prescriptions, OTCs, herbal therapies and supplements) and documented in the medical record.    HPI    Generally feeling well.    Breast growth he first noticed in July 2023, didn't mention it to his wife until mid October. First noticed size of pea, now about the size of a marble. Doesn't bother him unless he bumps it. Denies drainage/discharge, overlying skin change, lumps or bumps anywhere else, weight loss. 3/6 sisters had breast cancer, one other sister with uterine cancer.    Patient Care Team:  Rafael Todd DO as PCP - General (Family Medicine)  Brennen Ruano MD as Pulmonologist (Pulmonary Disease)  Suzanna Rothman PA-C as Physician Assistant (Hematology and Oncology)  Kingsley Cheng MD as Referring Physician (Hematology and Oncology)     Review of Systems   Constitutional:  Negative for chills, fever and unexpected weight change.   HENT:  Negative for ear pain and trouble swallowing.    Respiratory:  Negative for cough, shortness of breath and wheezing.    Cardiovascular:  Positive for chest pain (breast).   Gastrointestinal:  Negative for abdominal pain, blood in stool, diarrhea, nausea and vomiting.   Genitourinary:  Negative for difficulty urinating and dysuria.   Skin:  Negative for rash.   Neurological:  Negative for dizziness, syncope, weakness, light-headedness and numbness.   Psychiatric/Behavioral:  Negative for behavioral problems and confusion.        Objective   Vitals:  /53   Pulse 64   Ht 1.689 m (5' 6.5\")   Wt 74.4 kg (164 lb)   SpO2 94%   BMI 26.07 kg/m²       Physical Exam  Vitals and nursing note reviewed.   Constitutional:       General: He is not in acute distress.     Appearance: Normal appearance.   HENT:      Head: Normocephalic and atraumatic.   Eyes:      General: No " scleral icterus.     Extraocular Movements: Extraocular movements intact.      Conjunctiva/sclera: Conjunctivae normal.   Pulmonary:      Effort: Pulmonary effort is normal. No respiratory distress.   Chest:   Breasts:     Breasts are asymmetrical.      Left: Mass (subareolar ~2.5 cm) and tenderness (subareolar mass) present. No nipple discharge or skin change.   Genitourinary:     Testes:         Right: Mass or tenderness not present. Right testis is descended.         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   Musculoskeletal:      Right lower leg: No edema.      Left lower leg: No edema.   Lymphadenopathy:      Upper Body:      Right upper body: No supraclavicular, axillary or pectoral adenopathy.      Left upper body: No supraclavicular, axillary or pectoral adenopathy.   Skin:     General: Skin is warm and dry.      Coloration: Skin is not jaundiced.   Neurological:      Mental Status: He is alert and oriented to person, place, and time. Mental status is at baseline.   Psychiatric:         Behavior: Behavior normal.         Assessment/Plan   Problem List Items Addressed This Visit       Hypercholesterolemia    Relevant Orders    TSH with reflex to Free T4 if abnormal    Triiodothyronine, Free    TSH elevation    Relevant Orders    TSH with reflex to Free T4 if abnormal    Triiodothyronine, Free    Medicare annual wellness visit, subsequent - Primary    Current Assessment & Plan     89yM with MDS, doing fairly well.         Breast mass in male    Current Assessment & Plan     Likely unilateral gynecomastia. Check diagnostic mammogram, hormones.         Relevant Orders    BI mammo left diagnostic tomosynthesis    Referral to Breast Surgery    Testosterone    Luteinizing hormone    HCG, tumor marker    Estradiol     Other Visit Diagnoses       Need for vaccination        Relevant Orders    Flu vaccine, high dose seasonal, preservative free    Breast tenderness  in male        Relevant Orders    BI mammo left diagnostic tomosynthesis    Unilateral non-palpable testicle        Relevant Orders    HCG, tumor marker

## 2023-11-27 NOTE — PATIENT INSTRUCTIONS
Recommend the RSV vaccine.    General Surgery  Dr. Christian Horner, Lorene Sethi PA-C, Dr. Clint Moreau, Dr. Michelle Murdock, Linnette Aguilar CNP, Dr. Casey Olson ([place]) [phone]  Dr. Jackie Aguirre ([place]) [phone]  Dr. Buck Rick, Dr. Jimmy Flores, [facility] ([place]) [phone]     Please return for your next wellness visit in 12 months, earlier if any question or concern.      For assistance with scheduling referrals or consultations, please call [phone]. For laboratory, radiology, and other tests, please call [phone] ([phone] for pediatrics). Please review prescription inserts and published information for possible adverse effects of all medications. Return after testing or consultation to review results and recommendations, if symptoms persist, change, worsen, or return, or if you have any question or concern. If you do not get results within 7-10 days, or you have any question or concern, please send a message, call the office ([phone]), or return to the office for a follow-up appointment. For non-emergencies, you may call the office. For emergencies, call 9-1-1 or go to the nearest Emergency Department. Please schedule additional appointment(s) to address concern(s) not addressed today.    In general, results are not released or discussed over the telephone. Results of tests done through [facility] are released via  [employer] (see below).  [email or web address]   [employer] support line: [phone]    Until we complete our transition to the new system, additional information can be found at [email or web address] or on your Android or iOS (iPhone, iPad) device using the [employer] kiki available free of charge in your device's kiki store.

## 2023-11-28 LAB
LH SERPL-ACNC: 9.1 IU/L
TESTOST SERPL-MCNC: 202 NG/DL (ref 240–1000)

## 2023-12-07 ENCOUNTER — LAB (OUTPATIENT)
Dept: LAB | Facility: LAB | Age: 88
End: 2023-12-07
Payer: MEDICARE

## 2023-12-07 ENCOUNTER — HOSPITAL ENCOUNTER (OUTPATIENT)
Dept: RADIOLOGY | Facility: HOSPITAL | Age: 88
Discharge: HOME | End: 2023-12-07
Payer: MEDICARE

## 2023-12-07 ENCOUNTER — ANCILLARY PROCEDURE (OUTPATIENT)
Dept: RADIOLOGY | Facility: HOSPITAL | Age: 88
End: 2023-12-07
Payer: MEDICARE

## 2023-12-07 DIAGNOSIS — N63.42 UNSPECIFIED LUMP IN LEFT BREAST, SUBAREOLAR: ICD-10-CM

## 2023-12-07 DIAGNOSIS — N63.0 BREAST MASS IN MALE: ICD-10-CM

## 2023-12-07 DIAGNOSIS — N63.20 LEFT BREAST LUMP: ICD-10-CM

## 2023-12-07 DIAGNOSIS — D69.6 THROMBOCYTOPENIA (CMS-HCC): ICD-10-CM

## 2023-12-07 DIAGNOSIS — N64.4 BREAST TENDERNESS IN MALE: ICD-10-CM

## 2023-12-07 DIAGNOSIS — D72.821 MONOCYTOSIS: ICD-10-CM

## 2023-12-07 DIAGNOSIS — R39.83 UNILATERAL NON-PALPABLE TESTICLE: ICD-10-CM

## 2023-12-07 DIAGNOSIS — D64.9 ANEMIA, UNSPECIFIED TYPE: ICD-10-CM

## 2023-12-07 LAB
B-HCG SERPL-ACNC: <3 MIU/ML
BASOPHILS # BLD MANUAL: 0.19 X10*3/UL (ref 0–0.1)
BASOPHILS NFR BLD MANUAL: 1 %
BURR CELLS BLD QL SMEAR: ABNORMAL
EOSINOPHIL # BLD MANUAL: 0.56 X10*3/UL (ref 0–0.4)
EOSINOPHIL NFR BLD MANUAL: 3 %
ERYTHROCYTE [DISTWIDTH] IN BLOOD BY AUTOMATED COUNT: 15.1 % (ref 11.5–14.5)
ESTRADIOL SERPL-MCNC: 27 PG/ML
HCT VFR BLD AUTO: 36.7 % (ref 41–52)
HGB BLD-MCNC: 11.4 G/DL (ref 13.5–17.5)
IMM GRANULOCYTES # BLD AUTO: 1.25 X10*3/UL (ref 0–0.5)
IMM GRANULOCYTES NFR BLD AUTO: 6.6 % (ref 0–0.9)
LYMPHOCYTES # BLD MANUAL: 3.01 X10*3/UL (ref 0.8–3)
LYMPHOCYTES NFR BLD MANUAL: 16 %
MCH RBC QN AUTO: 29.3 PG (ref 26–34)
MCHC RBC AUTO-ENTMCNC: 31.1 G/DL (ref 32–36)
MCV RBC AUTO: 94 FL (ref 80–100)
MONOCYTES # BLD MANUAL: 3.2 X10*3/UL (ref 0.05–0.8)
MONOCYTES NFR BLD MANUAL: 17 %
NEUTS SEG # BLD MANUAL: 11.84 X10*3/UL (ref 1.6–5)
NEUTS SEG NFR BLD MANUAL: 63 %
NRBC BLD-RTO: 0 /100 WBCS (ref 0–0)
PLATELET # BLD AUTO: 86 X10*3/UL (ref 150–450)
RBC # BLD AUTO: 3.89 X10*6/UL (ref 4.5–5.9)
RBC MORPH BLD: ABNORMAL
TOTAL CELLS COUNTED BLD: 100
WBC # BLD AUTO: 18.8 X10*3/UL (ref 4.4–11.3)

## 2023-12-07 PROCEDURE — 76642 ULTRASOUND BREAST LIMITED: CPT | Mod: 50,59

## 2023-12-07 PROCEDURE — 84702 CHORIONIC GONADOTROPIN TEST: CPT

## 2023-12-07 PROCEDURE — 82670 ASSAY OF TOTAL ESTRADIOL: CPT

## 2023-12-07 PROCEDURE — 85027 COMPLETE CBC AUTOMATED: CPT

## 2023-12-07 PROCEDURE — 77066 DX MAMMO INCL CAD BI: CPT | Performed by: RADIOLOGY

## 2023-12-07 PROCEDURE — G0279 TOMOSYNTHESIS, MAMMO: HCPCS | Performed by: RADIOLOGY

## 2023-12-07 PROCEDURE — 76983 USE EA ADDL TARGET LESION: CPT | Mod: 50

## 2023-12-07 PROCEDURE — 85007 BL SMEAR W/DIFF WBC COUNT: CPT

## 2023-12-07 PROCEDURE — 77062 BREAST TOMOSYNTHESIS BI: CPT

## 2023-12-07 PROCEDURE — 76642 ULTRASOUND BREAST LIMITED: CPT | Performed by: RADIOLOGY

## 2023-12-07 PROCEDURE — 36415 COLL VENOUS BLD VENIPUNCTURE: CPT

## 2023-12-14 ENCOUNTER — OFFICE VISIT (OUTPATIENT)
Dept: SURGERY | Facility: CLINIC | Age: 88
End: 2023-12-14
Payer: MEDICARE

## 2023-12-14 VITALS
DIASTOLIC BLOOD PRESSURE: 46 MMHG | WEIGHT: 164 LBS | HEIGHT: 67 IN | TEMPERATURE: 97.5 F | BODY MASS INDEX: 25.74 KG/M2 | SYSTOLIC BLOOD PRESSURE: 106 MMHG | OXYGEN SATURATION: 94 % | HEART RATE: 70 BPM

## 2023-12-14 DIAGNOSIS — N63.0 BREAST MASS IN MALE: ICD-10-CM

## 2023-12-14 PROCEDURE — 99203 OFFICE O/P NEW LOW 30 MIN: CPT | Performed by: SURGERY

## 2023-12-14 PROCEDURE — 1159F MED LIST DOCD IN RCRD: CPT | Performed by: SURGERY

## 2023-12-14 PROCEDURE — 1125F AMNT PAIN NOTED PAIN PRSNT: CPT | Performed by: SURGERY

## 2023-12-14 PROCEDURE — 1036F TOBACCO NON-USER: CPT | Performed by: SURGERY

## 2023-12-14 ASSESSMENT — PAIN SCALES - GENERAL: PAINLEVEL: 4

## 2023-12-14 NOTE — PROGRESS NOTES
Subjective   Patient ID: Abdias Mike is a 89 y.o. male who presents for New Patient Visit (NPV Left Breast Mass Kaleb).  HPI  This is a pleasant gentleman who is 89 years old.  He is here because of a left breast mass that has been painful and bothering him mostly just when it gets bumped.  The patient noticed it probably this past summer and just mentioned it recently because it has been tender.  The patient is reasonably healthy he does have some back issues he also has MDS and follows with hematology and oncology.  This is just being monitored at this time.  The patient had a diagnostic workup performed and the findings on mammogram and ultrasound were gynecomasty on both sides the left side larger than the right.  Review of Systems  10 point review is basically negative or does not pertain.    Social history he does not smoke he does drink 2 beers a day.  Objective   Physical Exam head is normocephalic atraumatic eyes extraocular movements are intact lungs are clear bilaterally heart is regular rate and rhythm abdomen is flat and soft the chest is examined and the patient has no palpable mass on the right but on the left there is a palpable subareolar mass that measures her feels to be about 2 cm in size.  It also is slightly tender.  On imaging the mammogram and the ultrasound confirmed bilateral gynecomastia.    Assessment/Plan I do not recommend any intervention since now that the patient knows it has been a benign process he is not as worried about it is it as he was and he also states he can live with the occasional discomfort.  He can follow-up for a physical exam in 6 months.           Jackie Aguirre MD 12/14/23 12:26 PM   
Yes

## 2023-12-28 PROBLEM — H25.019 CORTICAL SENILE CATARACT: Status: ACTIVE | Noted: 2023-12-28

## 2023-12-28 PROBLEM — N40.1 BENIGN PROSTATIC HYPERPLASIA WITH LOWER URINARY TRACT SYMPTOMS: Status: ACTIVE | Noted: 2023-12-28

## 2023-12-28 PROBLEM — H43.819 VITREOUS DEGENERATION: Status: ACTIVE | Noted: 2023-12-28

## 2023-12-28 PROBLEM — K21.9 GASTROESOPHAGEAL REFLUX DISEASE: Status: ACTIVE | Noted: 2023-12-28

## 2023-12-28 PROBLEM — H52.4 PRESBYOPIA: Status: ACTIVE | Noted: 2023-12-28

## 2024-01-09 ENCOUNTER — OFFICE VISIT (OUTPATIENT)
Dept: HEMATOLOGY/ONCOLOGY | Facility: CLINIC | Age: 89
End: 2024-01-09
Payer: MEDICARE

## 2024-01-09 VITALS
HEART RATE: 69 BPM | TEMPERATURE: 97.2 F | HEIGHT: 67 IN | WEIGHT: 164.68 LBS | RESPIRATION RATE: 18 BRPM | OXYGEN SATURATION: 96 % | DIASTOLIC BLOOD PRESSURE: 59 MMHG | SYSTOLIC BLOOD PRESSURE: 105 MMHG | BODY MASS INDEX: 25.85 KG/M2

## 2024-01-09 DIAGNOSIS — D69.6 THROMBOCYTOPENIA (CMS-HCC): ICD-10-CM

## 2024-01-09 LAB
BASOPHILS # BLD MANUAL: 0 X10*3/UL (ref 0–0.1)
BASOPHILS NFR BLD MANUAL: 0 %
EOSINOPHIL # BLD MANUAL: 0 X10*3/UL (ref 0–0.4)
EOSINOPHIL NFR BLD MANUAL: 0 %
ERYTHROCYTE [DISTWIDTH] IN BLOOD BY AUTOMATED COUNT: 15 % (ref 11.5–14.5)
HCT VFR BLD AUTO: 36 % (ref 41–52)
HGB BLD-MCNC: 11.2 G/DL (ref 13.5–17.5)
IMM GRANULOCYTES # BLD AUTO: 1.16 X10*3/UL (ref 0–0.5)
IMM GRANULOCYTES NFR BLD AUTO: 7.3 % (ref 0–0.9)
LYMPHOCYTES # BLD MANUAL: 2.23 X10*3/UL (ref 0.8–3)
LYMPHOCYTES NFR BLD MANUAL: 14 %
MCH RBC QN AUTO: 29.5 PG (ref 26–34)
MCHC RBC AUTO-ENTMCNC: 31.1 G/DL (ref 32–36)
MCV RBC AUTO: 95 FL (ref 80–100)
METAMYELOCYTES # BLD MANUAL: 0.64 X10*3/UL
METAMYELOCYTES NFR BLD MANUAL: 4 %
MONOCYTES # BLD MANUAL: 3.5 X10*3/UL (ref 0.05–0.8)
MONOCYTES NFR BLD MANUAL: 22 %
MYELOCYTES # BLD MANUAL: 0.48 X10*3/UL
MYELOCYTES NFR BLD MANUAL: 3 %
NEUTROPHILS # BLD MANUAL: 8.91 X10*3/UL (ref 1.6–5.5)
NEUTS BAND # BLD MANUAL: 0.64 X10*3/UL (ref 0–0.5)
NEUTS BAND NFR BLD MANUAL: 4 %
NEUTS SEG # BLD MANUAL: 8.27 X10*3/UL (ref 1.6–5)
NEUTS SEG NFR BLD MANUAL: 52 %
NRBC BLD-RTO: ABNORMAL /100{WBCS}
PLATELET # BLD AUTO: 89 X10*3/UL (ref 150–450)
POLYCHROMASIA BLD QL SMEAR: ABNORMAL
RBC # BLD AUTO: 3.8 X10*6/UL (ref 4.5–5.9)
RBC MORPH BLD: ABNORMAL
TOTAL CELLS COUNTED BLD: 100
VARIANT LYMPHS # BLD MANUAL: 0.16 X10*3/UL (ref 0–0.3)
VARIANT LYMPHS NFR BLD: 1 %
WBC # BLD AUTO: 15.9 X10*3/UL (ref 4.4–11.3)

## 2024-01-09 PROCEDURE — 1159F MED LIST DOCD IN RCRD: CPT | Performed by: INTERNAL MEDICINE

## 2024-01-09 PROCEDURE — 1036F TOBACCO NON-USER: CPT | Performed by: INTERNAL MEDICINE

## 2024-01-09 PROCEDURE — 1126F AMNT PAIN NOTED NONE PRSNT: CPT | Performed by: INTERNAL MEDICINE

## 2024-01-09 PROCEDURE — 36415 COLL VENOUS BLD VENIPUNCTURE: CPT | Performed by: INTERNAL MEDICINE

## 2024-01-09 PROCEDURE — 99213 OFFICE O/P EST LOW 20 MIN: CPT | Performed by: INTERNAL MEDICINE

## 2024-01-09 PROCEDURE — 85007 BL SMEAR W/DIFF WBC COUNT: CPT | Performed by: INTERNAL MEDICINE

## 2024-01-09 PROCEDURE — 85027 COMPLETE CBC AUTOMATED: CPT | Performed by: INTERNAL MEDICINE

## 2024-01-09 ASSESSMENT — PATIENT HEALTH QUESTIONNAIRE - PHQ9
2. FEELING DOWN, DEPRESSED OR HOPELESS: NOT AT ALL
1. LITTLE INTEREST OR PLEASURE IN DOING THINGS: NOT AT ALL
SUM OF ALL RESPONSES TO PHQ9 QUESTIONS 1 AND 2: 0

## 2024-01-09 ASSESSMENT — COLUMBIA-SUICIDE SEVERITY RATING SCALE - C-SSRS
6. HAVE YOU EVER DONE ANYTHING, STARTED TO DO ANYTHING, OR PREPARED TO DO ANYTHING TO END YOUR LIFE?: NO
2. HAVE YOU ACTUALLY HAD ANY THOUGHTS OF KILLING YOURSELF?: NO
1. IN THE PAST MONTH, HAVE YOU WISHED YOU WERE DEAD OR WISHED YOU COULD GO TO SLEEP AND NOT WAKE UP?: NO

## 2024-01-09 ASSESSMENT — ENCOUNTER SYMPTOMS
OCCASIONAL FEELINGS OF UNSTEADINESS: 0
LOSS OF SENSATION IN FEET: 0
DEPRESSION: 0

## 2024-01-09 ASSESSMENT — PAIN SCALES - GENERAL: PAINLEVEL: 0-NO PAIN

## 2024-01-09 NOTE — PROGRESS NOTES
"Patient ID: Abdias Mike is a 89 y.o. male.  Referring Physician: Kingsley Cheng MD  [id number] [place] [address]  Primary Care Provider: Rafael Todd DO  Visit Type:  Follow Up         Subjective    HPI  Referred to hematology because of thrombocytopenia.  Given the fact that patient is 89 years of  age myelodysplastic syndrome remains distinct differential.     Left breast mass: Patient knows it is a benign lesion: No intervention planned.    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  We will continue to monitor CBCs and see patient  every 3 to 6 months if platelet count continues to plummet patient will be treated with oral TPO agonist.  We will reserve also the option of treating with hypomethylating agents.  Immunosuppressive agents also remain an option for this 89-year-old man.    Review of Systems   Constitutional: Negative.    HENT:  Negative.          Objective   BSA: 1.88 meters squared  /59 (BP Location: Left arm, Patient Position: Sitting, BP Cuff Size: Adult)   Pulse 69   Temp 36.2 °C (97.2 °F) (Temporal)   Resp 18   Ht (S) 1.71 m (5' 7.32\")   Wt 74.7 kg (164 lb 10.9 oz)   SpO2 96%   BMI 25.55 kg/m²      has a past medical history of Anemia and Arthritis.   has a past surgical history that includes XR knee; Cataract extraction, bilateral; Hernia repair; and CT guided percutaneous biopsy bone deep (9/12/2023).  Family History   Problem Relation Name Age of Onset    Other (diabetic, heart failure) Mother      Heart failure Father       Oncology History    No history exists. "       Abdias Mike  reports that he has quit smoking. His smoking use included cigarettes. He has never used smokeless tobacco.  He  reports current alcohol use.  He  reports no history of drug use.    Physical Exam  Constitutional:       Appearance: Normal appearance.   HENT:      Head: Normocephalic and atraumatic.   Eyes:      Extraocular Movements: Extraocular movements intact.      Pupils: Pupils are equal, round, and reactive to light.   Abdominal:      Palpations: Abdomen is soft.   Neurological:      Mental Status: He is alert.         WBC   Date/Time Value Ref Range Status   12/07/2023 08:41 AM 18.8 (H) 4.4 - 11.3 x10*3/uL Final   11/03/2023 10:52 AM 14.3 (H) 4.4 - 11.3 x10*3/uL Final   08/21/2023 08:03 AM 14.2 (H) 4.4 - 11.3 x10E9/L Final   08/10/2023 10:36 AM 15.4 (H) 4.4 - 11.3 x10E9/L Final   05/10/2023 03:07 PM 15.9 (H) 4.4 - 11.3 x10E9/L Final     nRBC   Date Value Ref Range Status   12/07/2023 0.0 0.0 - 0.0 /100 WBCs Final   11/03/2023 0.0 0.0 - 0.0 /100 WBCs Final   08/21/2023 0.0 0.0 - 0.0 /100 WBC Final   05/10/2023 0.1 0.0 - 0.0 /100 WBC Final     RBC   Date Value Ref Range Status   12/07/2023 3.89 (L) 4.50 - 5.90 x10*6/uL Final   11/03/2023 3.59 (L) 4.50 - 5.90 x10*6/uL Final   08/21/2023 4.05 (L) 4.50 - 5.90 x10E12/L Final   08/10/2023 3.90 (L) 4.50 - 5.90 x10E12/L Final   05/10/2023 3.75 (L) 4.50 - 5.90 x10E12/L Final     Hemoglobin   Date Value Ref Range Status   12/07/2023 11.4 (L) 13.5 - 17.5 g/dL Final   11/03/2023 10.6 (L) 13.5 - 17.5 g/dL Final   08/21/2023 11.9 (L) 13.5 - 17.5 g/dL Final   08/10/2023 11.5 (L) 13.5 - 17.5 g/dL Final   05/10/2023 11.0 (L) 13.5 - 17.5 g/dL Final     Hematocrit   Date Value Ref Range Status   12/07/2023 36.7 (L) 41.0 - 52.0 % Final   11/03/2023 33.7 (L) 41.0 - 52.0 % Final   08/21/2023 38.7 (L) 41.0 - 52.0 % Final   08/10/2023 36.9 (L) 41.0 - 52.0 % Final   05/10/2023 35.9 (L) 41.0 - 52.0 % Final     MCV   Date/Time Value Ref Range Status   12/07/2023  "08:41 AM 94 80 - 100 fL Final   11/03/2023 10:52 AM 94 80 - 100 fL Final   08/21/2023 08:03 AM 96 80 - 100 fL Final   08/10/2023 10:36 AM 95 80 - 100 fL Final   05/10/2023 03:07 PM 96 80 - 100 fL Final     MCH   Date/Time Value Ref Range Status   12/07/2023 08:41 AM 29.3 26.0 - 34.0 pg Final   11/03/2023 10:52 AM 29.5 26.0 - 34.0 pg Final     MCHC   Date/Time Value Ref Range Status   12/07/2023 08:41 AM 31.1 (L) 32.0 - 36.0 g/dL Final   11/03/2023 10:52 AM 31.5 (L) 32.0 - 36.0 g/dL Final   08/21/2023 08:03 AM 30.7 (L) 32.0 - 36.0 g/dL Final   08/10/2023 10:36 AM 31.2 (L) 32.0 - 36.0 g/dL Final   05/10/2023 03:07 PM 30.6 (L) 32.0 - 36.0 g/dL Final     RDW   Date/Time Value Ref Range Status   12/07/2023 08:41 AM 15.1 (H) 11.5 - 14.5 % Final   11/03/2023 10:52 AM 15.0 (H) 11.5 - 14.5 % Final   08/21/2023 08:03 AM 15.2 (H) 11.5 - 14.5 % Final   08/10/2023 10:36 AM 14.7 (H) 11.5 - 14.5 % Final   05/10/2023 03:07 PM 15.3 (H) 11.5 - 14.5 % Final     Platelets   Date/Time Value Ref Range Status   12/07/2023 08:41 AM 86 (L) 150 - 450 x10*3/uL Final   11/03/2023 10:52 AM 93 (L) 150 - 450 x10*3/uL Final   08/21/2023 08:03 AM 94 (L) 150 - 450 x10E9/L Final   08/10/2023 10:36  (L) 150 - 450 x10E9/L Final   05/10/2023 03:07  (L) 150 - 450 x10E9/L Final     No results found for: \"MPV\"  Neutrophils %   Date/Time Value Ref Range Status   08/11/2022 08:22 AM 42.9 40.0 - 80.0 % Final   08/12/2021 03:40 PM 38.1 40.0 - 80.0 % Final     Immature Granulocytes %, Automated   Date/Time Value Ref Range Status   12/07/2023 08:41 AM 6.6 (H) 0.0 - 0.9 % Final     Comment:     Immature Granulocyte Count (IG) includes promyelocytes, myelocytes and metamyelocytes but does not include bands. Percent differential counts (%) should be interpreted in the context of the absolute cell counts (cells/UL).   11/03/2023 10:52 AM 5.2 (H) 0.0 - 0.9 % Final     Comment:     Immature Granulocyte Count (IG) includes promyelocytes, myelocytes and " metamyelocytes but does not include bands. Percent differential counts (%) should be interpreted in the context of the absolute cell counts (cells/UL).   08/21/2023 08:03 AM 6.1 (H) 0.0 - 0.9 % Final     Comment:      Immature Granulocyte Count (IG) includes promyelocytes,    myelocytes and metamyelocytes but does not include bands.   Percent differential counts (%) should be interpreted in the   context of the absolute cell counts (cells/L).   08/10/2023 10:36 AM 6.0 (H) 0.0 - 0.9 % Final     Comment:      Immature Granulocyte Count (IG) includes promyelocytes,    myelocytes and metamyelocytes but does not include bands.   Percent differential counts (%) should be interpreted in the   context of the absolute cell counts (cells/L).     08/11/2022 08:22 AM 1.8 (H) 0.0 - 0.9 % Final     Comment:      Immature Granulocyte Count (IG) includes promyelocytes,    myelocytes and metamyelocytes but does not include bands.   Percent differential counts (%) should be interpreted in the   context of the absolute cell counts (cells/L).       Lymphocytes %, Manual   Date/Time Value Ref Range Status   12/07/2023 08:41 AM 16.0 13.0 - 44.0 % Final   11/03/2023 10:52 AM 15.0 13.0 - 44.0 % Final     Lymphocytes %   Date/Time Value Ref Range Status   08/21/2023 08:03 AM 12.4 13.0 - 44.0 % Final   08/10/2023 10:36 AM 19.0 13.0 - 44.0 % Final   08/11/2022 08:22 AM 24.5 13.0 - 44.0 % Final     Comment:      Percent differential counts (%) should be interpreted in the   context of the absolute cell counts (cells/L).     08/12/2021 03:40 PM 17.4 13.0 - 44.0 % Final     Monocytes %, Manual   Date/Time Value Ref Range Status   12/07/2023 08:41 AM 17.0 2.0 - 10.0 % Final   11/03/2023 10:52 AM 25.0 2.0 - 10.0 % Final     Monocytes %   Date/Time Value Ref Range Status   08/21/2023 08:03 AM 19.8 2.0 - 10.0 % Final   08/10/2023 10:36 AM 13.0 2.0 - 10.0 % Final   08/11/2022 08:22 AM 28.2 2.0 - 10.0 % Final   08/12/2021 03:40 PM 42.5 2.0 - 10.0 %  Final     Eosinophils %, Manual   Date/Time Value Ref Range Status   12/07/2023 08:41 AM 3.0 0.0 - 6.0 % Final   11/03/2023 10:52 AM 1.0 0.0 - 6.0 % Final     Eosinophils %   Date/Time Value Ref Range Status   08/21/2023 08:03 AM 1.7 0.0 - 6.0 % Final   08/10/2023 10:36 AM 2.0 0.0 - 6.0 % Final   08/11/2022 08:22 AM 2.2 0.0 - 6.0 % Final   08/12/2021 03:40 PM 1.3 0.0 - 6.0 % Final     Basophils %, Manual   Date/Time Value Ref Range Status   12/07/2023 08:41 AM 1.0 0.0 - 2.0 % Final   11/03/2023 10:52 AM 0.0 0.0 - 2.0 % Final     Basophils %   Date/Time Value Ref Range Status   08/10/2023 10:36 AM 0.0 0.0 - 2.0 % Final   08/11/2022 08:22 AM 0.4 0.0 - 2.0 % Final   08/12/2021 03:40 PM 0.2 0.0 - 2.0 % Final     Neutrophils Absolute   Date/Time Value Ref Range Status   08/11/2022 08:22 AM 4.42 1.60 - 5.50 x10E9/L Final   08/12/2021 03:40 PM 3.16 1.60 - 5.50 x10E9/L Final     Immature Granulocytes Absolute, Automated   Date/Time Value Ref Range Status   12/07/2023 08:41 AM 1.25 (H) 0.00 - 0.50 x10*3/uL Final   11/03/2023 10:52 AM 0.75 (H) 0.00 - 0.50 x10*3/uL Final     Lymphocytes Absolute   Date/Time Value Ref Range Status   08/11/2022 08:22 AM 2.52 0.80 - 3.00 x10E9/L Final   08/12/2021 03:40 PM 1.45 0.80 - 3.00 x10E9/L Final     Monocytes Absolute   Date/Time Value Ref Range Status   08/11/2022 08:22 AM 2.90 (H) 0.05 - 0.80 x10E9/L Final   08/12/2021 03:40 PM 3.53 (H) 0.05 - 0.80 x10E9/L Final     Eosinophils Absolute   Date/Time Value Ref Range Status   08/21/2023 08:03 AM 0.24 0.00 - 0.40 x10E9/L Final   08/10/2023 10:36 AM 0.31 0.00 - 0.40 x10E9/L Final   08/11/2022 08:22 AM 0.23 0.00 - 0.40 x10E9/L Final   08/12/2021 03:40 PM 0.11 0.00 - 0.40 x10E9/L Final     Eosinophils Absolute, Manual   Date/Time Value Ref Range Status   12/07/2023 08:41 AM 0.56 (H) 0.00 - 0.40 x10*3/uL Final   11/03/2023 10:52 AM 0.14 0.00 - 0.40 x10*3/uL Final     Basophils Absolute   Date/Time Value Ref Range Status   08/10/2023 10:36 AM  "0.00 0.00 - 0.10 x10E9/L Final   08/11/2022 08:22 AM 0.04 0.00 - 0.10 x10E9/L Final   08/12/2021 03:40 PM 0.02 0.00 - 0.10 x10E9/L Final     Basophils Absolute, Manual   Date/Time Value Ref Range Status   12/07/2023 08:41 AM 0.19 (H) 0.00 - 0.10 x10*3/uL Final   11/03/2023 10:52 AM 0.00 0.00 - 0.10 x10*3/uL Final       No components found for: \"PT\"  No results found for: \"APTT\"    Assessment/Plan      MDS: CBC q 3 monthly: RTC 6 months:     No intervention planned at this time but clinically patient has 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  We will continue to monitor CBCs and see patient  every 3 to 6 months if platelet count continues to plummet patient will be treated with oral TPO agonist.      Diagnoses and all orders for this visit:  Thrombocytopenia (CMS/HCC)  -     CBC and Auto Differential; Future  -     Clinic Appointment Request KINGSLEY MONREAL; Future  -     CBC and Auto Differential; Standing  -     Manual Differential           Kingsley Monreal MD                         "

## 2024-01-09 NOTE — PATIENT INSTRUCTIONS
Today you met with Dr. Cheng.  Please have your labs drawn in 3 months and we will see you back in 6 months with an MD and labs.  Call for any concerns.

## 2024-01-14 ENCOUNTER — HOSPITAL ENCOUNTER (INPATIENT)
Facility: HOSPITAL | Age: 89
LOS: 1 days | DRG: 064 | End: 2024-01-16
Attending: EMERGENCY MEDICINE | Admitting: FAMILY MEDICINE
Payer: MEDICARE

## 2024-01-14 ENCOUNTER — APPOINTMENT (OUTPATIENT)
Dept: RADIOLOGY | Facility: HOSPITAL | Age: 89
DRG: 064 | End: 2024-01-14
Payer: MEDICARE

## 2024-01-14 ENCOUNTER — APPOINTMENT (OUTPATIENT)
Dept: CARDIOLOGY | Facility: HOSPITAL | Age: 89
DRG: 064 | End: 2024-01-14
Payer: MEDICARE

## 2024-01-14 DIAGNOSIS — R29.90 STROKE-LIKE SYMPTOMS: Primary | ICD-10-CM

## 2024-01-14 LAB
ALBUMIN SERPL BCP-MCNC: 4 G/DL (ref 3.4–5)
ALP SERPL-CCNC: 65 U/L (ref 33–136)
ALT SERPL W P-5'-P-CCNC: 12 U/L (ref 10–52)
ANION GAP SERPL CALC-SCNC: 15 MMOL/L (ref 10–20)
APTT PPP: 31 SECONDS (ref 27–38)
AST SERPL W P-5'-P-CCNC: 22 U/L (ref 9–39)
BASOPHILS # BLD MANUAL: 0 X10*3/UL (ref 0–0.1)
BASOPHILS NFR BLD MANUAL: 0 %
BILIRUB SERPL-MCNC: 0.5 MG/DL (ref 0–1.2)
BUN SERPL-MCNC: 30 MG/DL (ref 6–23)
CALCIUM SERPL-MCNC: 8.7 MG/DL (ref 8.6–10.3)
CARDIAC TROPONIN I PNL SERPL HS: 9 NG/L (ref 0–20)
CHLORIDE SERPL-SCNC: 98 MMOL/L (ref 98–107)
CO2 SERPL-SCNC: 22 MMOL/L (ref 21–32)
CREAT SERPL-MCNC: 1.68 MG/DL (ref 0.5–1.3)
EGFRCR SERPLBLD CKD-EPI 2021: 38 ML/MIN/1.73M*2
EOSINOPHIL # BLD MANUAL: 0.21 X10*3/UL (ref 0–0.4)
EOSINOPHIL NFR BLD MANUAL: 1 %
ERYTHROCYTE [DISTWIDTH] IN BLOOD BY AUTOMATED COUNT: 15 % (ref 11.5–14.5)
GLUCOSE BLD MANUAL STRIP-MCNC: 126 MG/DL (ref 74–99)
GLUCOSE SERPL-MCNC: 116 MG/DL (ref 74–99)
HCT VFR BLD AUTO: 35.2 % (ref 41–52)
HGB BLD-MCNC: 11.2 G/DL (ref 13.5–17.5)
IMM GRANULOCYTES # BLD AUTO: 1.32 X10*3/UL (ref 0–0.5)
IMM GRANULOCYTES NFR BLD AUTO: 6.2 % (ref 0–0.9)
INR PPP: 1.2 (ref 0.9–1.1)
LYMPHOCYTES # BLD MANUAL: 3.83 X10*3/UL (ref 0.8–3)
LYMPHOCYTES NFR BLD MANUAL: 18 %
MCH RBC QN AUTO: 29.1 PG (ref 26–34)
MCHC RBC AUTO-ENTMCNC: 31.8 G/DL (ref 32–36)
MCV RBC AUTO: 91 FL (ref 80–100)
METAMYELOCYTES # BLD MANUAL: 0.64 X10*3/UL
METAMYELOCYTES NFR BLD MANUAL: 3 %
MONOCYTES # BLD MANUAL: 5.54 X10*3/UL (ref 0.05–0.8)
MONOCYTES NFR BLD MANUAL: 26 %
MYELOCYTES # BLD MANUAL: 0.21 X10*3/UL
MYELOCYTES NFR BLD MANUAL: 1 %
NEUTROPHILS # BLD MANUAL: 10.86 X10*3/UL (ref 1.6–5.5)
NEUTS BAND # BLD MANUAL: 0.64 X10*3/UL (ref 0–0.5)
NEUTS BAND NFR BLD MANUAL: 3 %
NEUTS SEG # BLD MANUAL: 10.22 X10*3/UL (ref 1.6–5)
NEUTS SEG NFR BLD MANUAL: 48 %
NRBC BLD-RTO: 0 /100 WBCS (ref 0–0)
PLATELET # BLD AUTO: 90 X10*3/UL (ref 150–450)
POLYCHROMASIA BLD QL SMEAR: ABNORMAL
POTASSIUM SERPL-SCNC: 3.5 MMOL/L (ref 3.5–5.3)
PROT SERPL-MCNC: 7.3 G/DL (ref 6.4–8.2)
PROTHROMBIN TIME: 13.1 SECONDS (ref 9.8–12.8)
RBC # BLD AUTO: 3.85 X10*6/UL (ref 4.5–5.9)
RBC MORPH BLD: ABNORMAL
SODIUM SERPL-SCNC: 131 MMOL/L (ref 136–145)
TOTAL CELLS COUNTED BLD: 100
WBC # BLD AUTO: 21.3 X10*3/UL (ref 4.4–11.3)

## 2024-01-14 PROCEDURE — 96365 THER/PROPH/DIAG IV INF INIT: CPT

## 2024-01-14 PROCEDURE — 85027 COMPLETE CBC AUTOMATED: CPT | Performed by: EMERGENCY MEDICINE

## 2024-01-14 PROCEDURE — 2550000001 HC RX 255 CONTRASTS: Performed by: EMERGENCY MEDICINE

## 2024-01-14 PROCEDURE — 96375 TX/PRO/DX INJ NEW DRUG ADDON: CPT

## 2024-01-14 PROCEDURE — 70496 CT ANGIOGRAPHY HEAD: CPT | Performed by: RADIOLOGY

## 2024-01-14 PROCEDURE — 82947 ASSAY GLUCOSE BLOOD QUANT: CPT

## 2024-01-14 PROCEDURE — 96374 THER/PROPH/DIAG INJ IV PUSH: CPT | Mod: 59

## 2024-01-14 PROCEDURE — 2500000004 HC RX 250 GENERAL PHARMACY W/ HCPCS (ALT 636 FOR OP/ED)

## 2024-01-14 PROCEDURE — 80053 COMPREHEN METABOLIC PANEL: CPT | Performed by: EMERGENCY MEDICINE

## 2024-01-14 PROCEDURE — 70498 CT ANGIOGRAPHY NECK: CPT

## 2024-01-14 PROCEDURE — 85007 BL SMEAR W/DIFF WBC COUNT: CPT | Performed by: EMERGENCY MEDICINE

## 2024-01-14 PROCEDURE — 36415 COLL VENOUS BLD VENIPUNCTURE: CPT | Performed by: EMERGENCY MEDICINE

## 2024-01-14 PROCEDURE — 85610 PROTHROMBIN TIME: CPT | Performed by: EMERGENCY MEDICINE

## 2024-01-14 PROCEDURE — 70498 CT ANGIOGRAPHY NECK: CPT | Performed by: RADIOLOGY

## 2024-01-14 PROCEDURE — 70450 CT HEAD/BRAIN W/O DYE: CPT

## 2024-01-14 PROCEDURE — 96366 THER/PROPH/DIAG IV INF ADDON: CPT

## 2024-01-14 PROCEDURE — 99223 1ST HOSP IP/OBS HIGH 75: CPT | Performed by: NURSE PRACTITIONER

## 2024-01-14 PROCEDURE — 99291 CRITICAL CARE FIRST HOUR: CPT | Performed by: EMERGENCY MEDICINE

## 2024-01-14 PROCEDURE — 85730 THROMBOPLASTIN TIME PARTIAL: CPT | Performed by: EMERGENCY MEDICINE

## 2024-01-14 PROCEDURE — 84484 ASSAY OF TROPONIN QUANT: CPT | Performed by: EMERGENCY MEDICINE

## 2024-01-14 PROCEDURE — 70496 CT ANGIOGRAPHY HEAD: CPT

## 2024-01-14 PROCEDURE — 93005 ELECTROCARDIOGRAM TRACING: CPT

## 2024-01-14 PROCEDURE — 2500000004 HC RX 250 GENERAL PHARMACY W/ HCPCS (ALT 636 FOR OP/ED): Performed by: EMERGENCY MEDICINE

## 2024-01-14 RX ORDER — ONDANSETRON HYDROCHLORIDE 2 MG/ML
4 INJECTION, SOLUTION INTRAVENOUS ONCE
Status: COMPLETED | OUTPATIENT
Start: 2024-01-14 | End: 2024-01-14

## 2024-01-14 RX ORDER — FENTANYL CITRATE 50 UG/ML
50 INJECTION, SOLUTION INTRAMUSCULAR; INTRAVENOUS ONCE
Status: COMPLETED | OUTPATIENT
Start: 2024-01-14 | End: 2024-01-14

## 2024-01-14 RX ORDER — SCOLOPAMINE TRANSDERMAL SYSTEM 1 MG/1
1 PATCH, EXTENDED RELEASE TRANSDERMAL
Status: DISCONTINUED | OUTPATIENT
Start: 2024-01-15 | End: 2024-01-16 | Stop reason: HOSPADM

## 2024-01-14 RX ORDER — NICARDIPINE HYDROCHLORIDE 0.2 MG/ML
2.5-15 INJECTION INTRAVENOUS CONTINUOUS
Status: DISCONTINUED | OUTPATIENT
Start: 2024-01-14 | End: 2024-01-14

## 2024-01-14 RX ORDER — ONDANSETRON HYDROCHLORIDE 2 MG/ML
INJECTION, SOLUTION INTRAVENOUS
Status: COMPLETED
Start: 2024-01-14 | End: 2024-01-14

## 2024-01-14 RX ORDER — HALOPERIDOL 5 MG/ML
1 INJECTION INTRAMUSCULAR EVERY 4 HOURS PRN
Status: DISCONTINUED | OUTPATIENT
Start: 2024-01-14 | End: 2024-01-16 | Stop reason: HOSPADM

## 2024-01-14 RX ORDER — MORPHINE SULFATE 4 MG/ML
4 INJECTION INTRAVENOUS
Status: DISCONTINUED | OUTPATIENT
Start: 2024-01-14 | End: 2024-01-16 | Stop reason: HOSPADM

## 2024-01-14 RX ORDER — LORAZEPAM 2 MG/ML
0.5 INJECTION INTRAMUSCULAR EVERY 4 HOURS PRN
Status: DISCONTINUED | OUTPATIENT
Start: 2024-01-14 | End: 2024-01-16 | Stop reason: HOSPADM

## 2024-01-14 RX ORDER — KETOROLAC TROMETHAMINE 15 MG/ML
15 INJECTION, SOLUTION INTRAMUSCULAR; INTRAVENOUS EVERY 6 HOURS PRN
Status: DISCONTINUED | OUTPATIENT
Start: 2024-01-14 | End: 2024-01-16 | Stop reason: HOSPADM

## 2024-01-14 RX ORDER — MORPHINE SULFATE 2 MG/ML
2 INJECTION, SOLUTION INTRAMUSCULAR; INTRAVENOUS
Status: DISCONTINUED | OUTPATIENT
Start: 2024-01-14 | End: 2024-01-16 | Stop reason: HOSPADM

## 2024-01-14 RX ORDER — PROPOFOL 10 MG/ML
INJECTION, EMULSION INTRAVENOUS
Status: DISCONTINUED
Start: 2024-01-14 | End: 2024-01-14 | Stop reason: WASHOUT

## 2024-01-14 RX ORDER — LORAZEPAM 2 MG/ML
2 INJECTION INTRAMUSCULAR EVERY 10 MIN PRN
Status: DISCONTINUED | OUTPATIENT
Start: 2024-01-14 | End: 2024-01-16 | Stop reason: HOSPADM

## 2024-01-14 RX ADMIN — ONDANSETRON 4 MG: 2 INJECTION INTRAMUSCULAR; INTRAVENOUS at 19:57

## 2024-01-14 RX ADMIN — NICARDIPINE HYDROCHLORIDE 2.5 MG/HR: 0.2 INJECTION, SOLUTION INTRAVENOUS at 20:56

## 2024-01-14 RX ADMIN — FENTANYL CITRATE 50 MCG: 50 INJECTION INTRAMUSCULAR; INTRAVENOUS at 20:20

## 2024-01-14 RX ADMIN — IOHEXOL 89 ML: 350 INJECTION, SOLUTION INTRAVENOUS at 19:45

## 2024-01-14 RX ADMIN — ONDANSETRON HYDROCHLORIDE 4 MG: 2 INJECTION, SOLUTION INTRAVENOUS at 19:57

## 2024-01-14 RX ADMIN — ONDANSETRON 4 MG: 2 INJECTION INTRAMUSCULAR; INTRAVENOUS at 20:56

## 2024-01-14 ASSESSMENT — PAIN - FUNCTIONAL ASSESSMENT
PAIN_FUNCTIONAL_ASSESSMENT: 0-10
PAIN_FUNCTIONAL_ASSESSMENT: 0-10

## 2024-01-14 ASSESSMENT — PAIN DESCRIPTION - LOCATION
LOCATION: HEAD

## 2024-01-14 ASSESSMENT — PAIN DESCRIPTION - PAIN TYPE: TYPE: ACUTE PAIN

## 2024-01-14 ASSESSMENT — ENCOUNTER SYMPTOMS: CONSTITUTIONAL NEGATIVE: 1

## 2024-01-14 ASSESSMENT — LIFESTYLE VARIABLES
HAVE YOU EVER FELT YOU SHOULD CUT DOWN ON YOUR DRINKING: NO
REASON UNABLE TO ASSESS: NO
HAVE PEOPLE ANNOYED YOU BY CRITICIZING YOUR DRINKING: NO
EVER FELT BAD OR GUILTY ABOUT YOUR DRINKING: NO
EVER HAD A DRINK FIRST THING IN THE MORNING TO STEADY YOUR NERVES TO GET RID OF A HANGOVER: NO

## 2024-01-14 ASSESSMENT — PAIN SCALES - GENERAL
PAINLEVEL_OUTOF10: 6
PAINLEVEL_OUTOF10: 6

## 2024-01-15 VITALS
RESPIRATION RATE: 22 BRPM | HEART RATE: 96 BPM | DIASTOLIC BLOOD PRESSURE: 50 MMHG | TEMPERATURE: 101.5 F | WEIGHT: 182.76 LBS | SYSTOLIC BLOOD PRESSURE: 121 MMHG | BODY MASS INDEX: 27.07 KG/M2 | OXYGEN SATURATION: 95 % | HEIGHT: 69 IN

## 2024-01-15 PROBLEM — C93.10: Status: ACTIVE | Noted: 2023-09-12

## 2024-01-15 PROBLEM — R39.83 UNILATERAL NON-PALPABLE TESTICLE: Status: RESOLVED | Noted: 2023-12-07 | Resolved: 2024-01-15

## 2024-01-15 PROBLEM — D50.9 IRON DEFICIENCY ANEMIA, UNSPECIFIED: Status: ACTIVE | Noted: 2023-05-10

## 2024-01-15 PROBLEM — D46.9 MYELODYSPLASTIC SYNDROME (MULTI): Status: ACTIVE | Noted: 2023-09-12

## 2024-01-15 LAB
ATRIAL RATE: 74 BPM
P AXIS: 39 DEGREES
P OFFSET: 183 MS
P ONSET: 136 MS
PR INTERVAL: 166 MS
Q ONSET: 219 MS
QRS COUNT: 13 BEATS
QRS DURATION: 124 MS
QT INTERVAL: 440 MS
QTC CALCULATION(BAZETT): 488 MS
QTC FREDERICIA: 472 MS
R AXIS: 30 DEGREES
T AXIS: 67 DEGREES
T OFFSET: 439 MS
VENTRICULAR RATE: 74 BPM

## 2024-01-15 PROCEDURE — 99222 1ST HOSP IP/OBS MODERATE 55: CPT | Performed by: NURSE PRACTITIONER

## 2024-01-15 PROCEDURE — 2500000004 HC RX 250 GENERAL PHARMACY W/ HCPCS (ALT 636 FOR OP/ED): Performed by: NURSE PRACTITIONER

## 2024-01-15 PROCEDURE — 1100000001 HC PRIVATE ROOM DAILY

## 2024-01-15 PROCEDURE — 2500000001 HC RX 250 WO HCPCS SELF ADMINISTERED DRUGS (ALT 637 FOR MEDICARE OP): Performed by: NURSE PRACTITIONER

## 2024-01-15 PROCEDURE — 99232 SBSQ HOSP IP/OBS MODERATE 35: CPT | Performed by: NURSE PRACTITIONER

## 2024-01-15 PROCEDURE — 2500000001 HC RX 250 WO HCPCS SELF ADMINISTERED DRUGS (ALT 637 FOR MEDICARE OP): Performed by: STUDENT IN AN ORGANIZED HEALTH CARE EDUCATION/TRAINING PROGRAM

## 2024-01-15 RX ORDER — MORPHINE SULFATE IN 0.9 % NACL 30 MG/30ML
PATIENT CONTROLLED ANALGESIA SYRINGE INTRAVENOUS CONTINUOUS
Status: DISCONTINUED | OUTPATIENT
Start: 2024-01-15 | End: 2024-01-16 | Stop reason: HOSPADM

## 2024-01-15 RX ORDER — ACETAMINOPHEN 650 MG/1
650 SUPPOSITORY RECTAL EVERY 6 HOURS PRN
Status: DISCONTINUED | OUTPATIENT
Start: 2024-01-15 | End: 2024-01-16 | Stop reason: HOSPADM

## 2024-01-15 RX ORDER — ATROPINE SULFATE 10 MG/ML
2 SOLUTION/ DROPS OPHTHALMIC EVERY 4 HOURS
Status: DISCONTINUED | OUTPATIENT
Start: 2024-01-15 | End: 2024-01-16 | Stop reason: HOSPADM

## 2024-01-15 RX ADMIN — ATROPINE SULFATE 2 DROP: 10 SOLUTION/ DROPS OPHTHALMIC at 08:33

## 2024-01-15 RX ADMIN — ATROPINE SULFATE 2 DROP: 10 SOLUTION/ DROPS OPHTHALMIC at 15:16

## 2024-01-15 RX ADMIN — HYDROMORPHONE HYDROCHLORIDE 0.4 MG: 1 INJECTION, SOLUTION INTRAMUSCULAR; INTRAVENOUS; SUBCUTANEOUS at 18:32

## 2024-01-15 RX ADMIN — MORPHINE SULFATE 2 MG: 2 INJECTION, SOLUTION INTRAMUSCULAR; INTRAVENOUS at 12:54

## 2024-01-15 RX ADMIN — ATROPINE SULFATE 2 DROP: 10 SOLUTION/ DROPS OPHTHALMIC at 04:38

## 2024-01-15 RX ADMIN — ACETAMINOPHEN 650 MG: 650 SUPPOSITORY RECTAL at 18:20

## 2024-01-15 RX ADMIN — MORPHINE SULFATE 4 MG: 4 INJECTION INTRAVENOUS at 15:16

## 2024-01-15 RX ADMIN — MORPHINE SULFATE: 10 INJECTION INTRAVENOUS at 15:29

## 2024-01-15 RX ADMIN — MORPHINE SULFATE 4 MG: 4 INJECTION INTRAVENOUS at 13:51

## 2024-01-15 RX ADMIN — KETOROLAC TROMETHAMINE 15 MG: 15 INJECTION, SOLUTION INTRAMUSCULAR; INTRAVENOUS at 17:23

## 2024-01-15 RX ADMIN — MORPHINE SULFATE 2 MG: 2 INJECTION, SOLUTION INTRAMUSCULAR; INTRAVENOUS at 00:47

## 2024-01-15 RX ADMIN — LORAZEPAM 0.5 MG: 2 INJECTION INTRAMUSCULAR; INTRAVENOUS at 00:47

## 2024-01-15 RX ADMIN — ATROPINE SULFATE 2 DROP: 10 SOLUTION/ DROPS OPHTHALMIC at 12:46

## 2024-01-15 SDOH — SOCIAL STABILITY: SOCIAL INSECURITY: HAS ANYONE EVER THREATENED TO HURT YOUR FAMILY OR YOUR PETS?: UNABLE TO ASSESS

## 2024-01-15 SDOH — SOCIAL STABILITY: SOCIAL INSECURITY: DO YOU FEEL UNSAFE GOING BACK TO THE PLACE WHERE YOU ARE LIVING?: UNABLE TO ASSESS

## 2024-01-15 SDOH — SOCIAL STABILITY: SOCIAL INSECURITY: DOES ANYONE TRY TO KEEP YOU FROM HAVING/CONTACTING OTHER FRIENDS OR DOING THINGS OUTSIDE YOUR HOME?: UNABLE TO ASSESS

## 2024-01-15 SDOH — SOCIAL STABILITY: SOCIAL INSECURITY: HAVE YOU HAD THOUGHTS OF HARMING ANYONE ELSE?: UNABLE TO ASSESS

## 2024-01-15 SDOH — SOCIAL STABILITY: SOCIAL INSECURITY: ARE THERE ANY APPARENT SIGNS OF INJURIES/BEHAVIORS THAT COULD BE RELATED TO ABUSE/NEGLECT?: UNABLE TO ASSESS

## 2024-01-15 SDOH — SOCIAL STABILITY: SOCIAL INSECURITY: DO YOU FEEL ANYONE HAS EXPLOITED OR TAKEN ADVANTAGE OF YOU FINANCIALLY OR OF YOUR PERSONAL PROPERTY?: UNABLE TO ASSESS

## 2024-01-15 SDOH — SOCIAL STABILITY: SOCIAL INSECURITY: WERE YOU ABLE TO COMPLETE ALL THE BEHAVIORAL HEALTH SCREENINGS?: NO

## 2024-01-15 SDOH — SOCIAL STABILITY: SOCIAL INSECURITY: ARE YOU OR HAVE YOU BEEN THREATENED OR ABUSED PHYSICALLY, EMOTIONALLY, OR SEXUALLY BY ANYONE?: UNABLE TO ASSESS

## 2024-01-15 SDOH — SOCIAL STABILITY: SOCIAL INSECURITY: ABUSE: ADULT

## 2024-01-15 ASSESSMENT — LIFESTYLE VARIABLES
SKIP TO QUESTIONS 9-10: 0
AUDIT-C TOTAL SCORE: -1
HOW OFTEN DO YOU HAVE A DRINK CONTAINING ALCOHOL: PATIENT UNABLE TO ANSWER
HOW MANY STANDARD DRINKS CONTAINING ALCOHOL DO YOU HAVE ON A TYPICAL DAY: PATIENT UNABLE TO ANSWER
AUDIT-C TOTAL SCORE: -1
HOW OFTEN DO YOU HAVE 6 OR MORE DRINKS ON ONE OCCASION: PATIENT UNABLE TO ANSWER

## 2024-01-15 ASSESSMENT — ENCOUNTER SYMPTOMS
NUMBNESS: 1
SHORTNESS OF BREATH: 1
DIARRHEA: 0
FATIGUE: 1
WEAKNESS: 1
CONFUSION: 1
ACTIVITY CHANGE: 1
COUGH: 0
WOUND: 0
NAUSEA: 0
VOMITING: 0
BRUISES/BLEEDS EASILY: 1

## 2024-01-15 ASSESSMENT — PAIN SCALES - PAIN ASSESSMENT IN ADVANCED DEMENTIA (PAINAD)
NEGVOCALIZATION: OCCASIONAL MOAN/GROAN, LOW SPEECH, NEGATIVE/DISAPPROVING QUALITY
BREATHING: NOISY LABORED BREATHING, LONG PERIODS OF HYPERVENTILATION, CHEYNE-STOKES RESPIRATIONS
TOTALSCORE: MEDICATION (SEE MAR)
BREATHING: NOISY LABORED BREATHING, LONG PERIODS OF HYPERVENTILATION, CHEYNE-STOKES RESPIRATIONS
FACIALEXPRESSION: SAD, FRIGHTENED, FROWN
BODYLANGUAGE: TENSE, DISTRESSED PACING, FIDGETING
BODYLANGUAGE: TENSE, DISTRESSED PACING, FIDGETING
TOTALSCORE: MEDICATION (SEE MAR)
TOTALSCORE: 7
CONSOLABILITY: UNABLE TO CONSOLE, DISTRACT OR REASSURE
NEGVOCALIZATION: OCCASIONAL MOAN/GROAN, LOW SPEECH, NEGATIVE/DISAPPROVING QUALITY
CONSOLABILITY: UNABLE TO CONSOLE, DISTRACT OR REASSURE
BREATHING: NORMAL
TOTALSCORE: 7
BODYLANGUAGE: TENSE, DISTRESSED PACING, FIDGETING
NEGVOCALIZATION: OCCASIONAL MOAN/GROAN, LOW SPEECH, NEGATIVE/DISAPPROVING QUALITY
TOTALSCORE: 0
BREATHING: OCCASIONAL LABORED BREATHING, SHORT PERIOD OF HYPERVENTILATION
CONSOLABILITY: NO NEED TO CONSOLE
BODYLANGUAGE: RELAXED
CONSOLABILITY: UNABLE TO CONSOLE, DISTRACT OR REASSURE
FACIALEXPRESSION: SAD, FRIGHTENED, FROWN
FACIALEXPRESSION: SMILING OR INEXPRESSIVE
TOTALSCORE: 5
FACIALEXPRESSION: SMILING OR INEXPRESSIVE

## 2024-01-15 ASSESSMENT — ACTIVITIES OF DAILY LIVING (ADL)
TOILETING: UNABLE TO ASSESS
ADEQUATE_TO_COMPLETE_ADL: UNABLE TO ASSESS
PATIENT'S MEMORY ADEQUATE TO SAFELY COMPLETE DAILY ACTIVITIES?: UNABLE TO ASSESS
GROOMING: UNABLE TO ASSESS
JUDGMENT_ADEQUATE_SAFELY_COMPLETE_DAILY_ACTIVITIES: UNABLE TO ASSESS
DRESSING YOURSELF: UNABLE TO ASSESS
BATHING: UNABLE TO ASSESS
ASSISTIVE_DEVICE: OTHER (COMMENT)
HEARING - RIGHT EAR: UNABLE TO ASSESS
FEEDING YOURSELF: UNABLE TO ASSESS
WALKS IN HOME: UNABLE TO ASSESS
HEARING - LEFT EAR: UNABLE TO ASSESS
LACK_OF_TRANSPORTATION: PATIENT UNABLE TO ANSWER

## 2024-01-15 ASSESSMENT — COGNITIVE AND FUNCTIONAL STATUS - GENERAL
CLIMB 3 TO 5 STEPS WITH RAILING: TOTAL
DAILY ACTIVITIY SCORE: 6
MOBILITY SCORE: 6
STANDING UP FROM CHAIR USING ARMS: TOTAL
PERSONAL GROOMING: TOTAL
MOVING TO AND FROM BED TO CHAIR: TOTAL
WALKING IN HOSPITAL ROOM: TOTAL
DRESSING REGULAR LOWER BODY CLOTHING: TOTAL
MOVING FROM LYING ON BACK TO SITTING ON SIDE OF FLAT BED WITH BEDRAILS: TOTAL
TOILETING: TOTAL
TURNING FROM BACK TO SIDE WHILE IN FLAT BAD: TOTAL
PATIENT BASELINE BEDBOUND: UNABLE TO ASSESS AT THIS TIME
HELP NEEDED FOR BATHING: TOTAL
EATING MEALS: TOTAL
DRESSING REGULAR UPPER BODY CLOTHING: TOTAL

## 2024-01-15 ASSESSMENT — RESPIRATORY DISTRESS OBSERVATION SCALE (RDOS)
GRUNTING AT END OF EXPIRATION: 0 - NONE
INVOLUNTARY NASAL FLARING: 0 - NONE
HEART RATE PER MINUTE: 1 - 90-109 BEATS
RESTLESS NONPURPOSEFUL MOVEMENTS: 0 - NONE
RDOS TOTAL SCORE: 4
HEART RATE PER MINUTE: 1 - 90-109 BEATS
RESPIRATORY RATE PER MINUTE: 1 - 19-30 BREATHS
GRUNTING AT END OF EXPIRATION: 0 - NONE
RESPIRATORY RATE PER MINUTE: 1 - 19-30 BREATHS
LOOK OF FEAR: 0 - NONE
RESTLESS NONPURPOSEFUL MOVEMENTS: 0 - NONE
LOOK OF FEAR: 0 - NONE
PARADOXICAL BREATHING PATTERN: 0 - NONE
LOOK OF FEAR: 0 - NONE
PARADOXICAL BREATHING PATTERN: 0 - NONE
INVOLUNTARY NASAL FLARING: 0 - NONE
RDOS TOTAL SCORE: 3
PARADOXICAL BREATHING PATTERN: 0 - NONE
GRUNTING AT END OF EXPIRATION: 0 - NONE
HEART RATE PER MINUTE: 1 - 90-109 BEATS
RDOS TOTAL SCORE: 3
ACCESSORY MUSCLE RISE IN CLAVICLE DURING INSPIRATION: 1 - SLIGHT RISE
RESPIRATORY RATE PER MINUTE: 1 - 19-30 BREATHS
INVOLUNTARY NASAL FLARING: 0 - NONE
ACCESSORY MUSCLE RISE IN CLAVICLE DURING INSPIRATION: 1 - SLIGHT RISE
ACCESSORY MUSCLE RISE IN CLAVICLE DURING INSPIRATION: 2 - PRONOUNCED RISE
RESTLESS NONPURPOSEFUL MOVEMENTS: 0 - NONE

## 2024-01-15 ASSESSMENT — PAIN - FUNCTIONAL ASSESSMENT
PAIN_FUNCTIONAL_ASSESSMENT: PAINAD (PAIN ASSESSMENT IN ADVANCED DEMENTIA SCALE)

## 2024-01-15 ASSESSMENT — PAIN SCALES - GENERAL
PAINLEVEL_OUTOF10: 5 - MODERATE PAIN
PAINLEVEL_OUTOF10: 7
PAINLEVEL_OUTOF10: 0 - NO PAIN

## 2024-01-15 ASSESSMENT — PATIENT HEALTH QUESTIONNAIRE - PHQ9
2. FEELING DOWN, DEPRESSED OR HOPELESS: NOT AT ALL
SUM OF ALL RESPONSES TO PHQ9 QUESTIONS 1 & 2: 0
1. LITTLE INTEREST OR PLEASURE IN DOING THINGS: NOT AT ALL

## 2024-01-15 NOTE — PROGRESS NOTES
Music Therapy Note    Abdias Mike was referred by Palliative Care    Therapy Session  Referral Type: New referral this admission  Visit Type: New visit  Session Start Time: 1353  Session End Time: 1357  Intervention Delivery: In-person  Conflict of Service: None  Number of family members present: 1  Family Participation: Supportive  Number of staff members present: 1     Pre-assessment  Unable to Assess Reason: Cognitive limitation  Mood/Affect: Calm         Treatment/Interventions  Music Therapy Interventions: Assessment  Interruption: No    Post-assessment  Unable to Assess Reason: Did not provide expressive therapy intervention  Total Session Time (min): 4 minutes    Narrative  Assessment Detail: Upon arrival of music therapist, pt was resting in bed currently recieving comfort care. P't family member present in room, expressed that pt enjoyed listening to big band music and classic country. Pt's family member had pt's favorite sport on the tv. Requested that music therapist follow up at a later time if possible.  Plan: MT provided educaiton on music therapy services with a focus on end of life support to pt and family support, followed by assessing pt's musical preference.  Intervention: No invervention implimented at this time.  Evaluation: NA  Follow-up: Will follow up later.    Education Documentation  No documentation found.

## 2024-01-15 NOTE — SIGNIFICANT EVENT
Pt only responded for a short period of time to pushes of morphine 2mg IV and 4mg IV about an hour apart.  He is currently breathing at 30rr/min with labor.   Morphine drip ordered at 2mg/hr and will increase per pt response  Dicsussed w nurse to notify provider of pt response 1hr after initiating the drip and to utilize bolus doses in the intermin if needed.     Follow up at 1645  Pt resps improved but labored and 26/min on 2mg/hr morphine drip + 2mg IV bolus x2 all within 1 hr.   Updated morphine drip to 4mg/hr + PRN 2mg bolus's from the  bag  Nurse to notify primary team if pt still not comfortable in 1 hr and needs new orders

## 2024-01-15 NOTE — CARE PLAN
Palliative Care Social Work Note    [facility]  See palliative care NP note for details.

## 2024-01-15 NOTE — ED PROVIDER NOTES
History/Exam limitations: due to condition.   Additional history was obtained from EMS personnel.    HPI:       Abdias Mike is a 89 y.o. with a history of myelodysplastic syndrome, thrombocytopenia Zentz to the ED with strokelike symptoms.  Last known well .  Has weakness in his left upper and lower extremity.  Blurry vision in left eye.  Not on anticoagulation.     Last Known Well Time: 18:55        ------------------------------------------------------------------------------------------------------------------------------------------     Physical Exam:    ED Triage Vitals   Temp Pulse Resp BP   -- -- -- --      SpO2 Temp src Heart Rate Source Patient Position   -- -- -- --      BP Location FiO2 (%)     -- --          Gen: Not in acute distress  Head/Neck: NCAT  Eyes: Anicteric sclerae, noninjected conjunctivae  Nose: No rhinorrhea  Mouth:  MMM  Heart: Regular rate and rhythm w/ no murmurs, rubs, gallops  Lungs: CTAB w/o wheezes, rales, rhonchi, no increased work of breathing  Abdomen: Soft, NT/ND  Musculoskeletal: No deformities  Extremities: No edema.  Neurologic: Please see below for NIHSS  Skin: No rashes noted  Psychological: Calm        Interval: Baseline  Time: 7:31 PM  Person Administering Scale: David Caicedo MD     1a  Level of consciousness: 0=alert; keenly responsive   1b. LOC questions:  0=Performs both tasks correctly   1c. LOC commands: 0=Performs both tasks correctly   2.  Best Gaze: 2=forced deviation, or total gaze paresis not overcome by oculocephalic maneuver   3. Visual: 1=Partial hemianopia   4. Facial Palsy: 1=Minor paralysis (flattened nasolabial fold, asymmetric on smiling)   5a. Motor left arm: 4=No movement   5b.  Motor right arm: 0=No drift, limb holds 90 (or 45) degrees for full 10 seconds   6a. motor left le=No movement   6b  Motor right le=No drift, limb holds 90 (or 45) degrees for full 10 seconds   7. Limb Ataxia: 0=Absent   8.  Sensory: 0=Normal; no sensory loss    9. Best Language:  1=Mild to moderate aphasia; some obvious loss of fluency or facility of comprehension without significant limitation on ideas expressed or form of expression.   10. Dysarthria: 1=Mild to moderate, patient slurs at least some words and at worst, can be understood with some difficulty   11. Extinction and Inattention: 2=Profound ye-inattention or ye-inattention to more than one modality. Does not recognize own hand or orients only to one side of space     Total:   16         VAN: VAN: Positive     ------------------------------------------------------------------------------------------------------------------------------------------     Medical Decision Makin-year-old male presents to the ED with strokelike symptoms.  Left-sided flaccid.  Otherwise ANO x 3.  Following commands.  Maintaining airway.  Not on anticoagulation.  Last platelets on  were <100.  CT concerning for massive ICH.  Discussed with Dr. Villa, neurosurgeon at [facility] [place] [facility].  Patient not a candidate for operative management.  Dr. Villa also talked with patient's family.  After discussions patient's mental status did decline more.  He had multiple episodes of vomiting.  He received Zofran.  Family would like patient to be made DNR comfort care.  Will be hospitalized for palliative management.    Diagnoses as of 01/15/24 0324   Stroke-like symptoms        EKG interpreted by myself:   Sinus rhythm, normal rate     Independent Interpretation of Studies: I independently interpreted the CT head and see Evidence of intracranial hemorrhage    Chronic Medical Conditions Significantly Affecting Care: N/A    Social Determinants of Health Significantly Affecting Care:  N/A     External Records Reviewed: I reviewed recent and relevant outside records including: N/A     Discussion of Management with Other Providers:   I discussed the patient/results with: family and neurology and the admitting team      IV Thrombolysis IV  Thrombolysis Checklist        IV Thrombolysis Given: No; Thrombolysis contraindication reason: CT (or MRI) with evidence of Acute Intracranial Hemorrhage          Procedure  Critical Care    Performed by: David Caicedo MD  Authorized by: David Caicedo MD    Critical care provider statement:     Critical care time (minutes):  60    Critical care time was exclusive of:  Separately billable procedures and treating other patients and teaching time    Critical care was necessary to treat or prevent imminent or life-threatening deterioration of the following conditions:  CNS failure or compromise    Critical care was time spent personally by me on the following activities:  Development of treatment plan with patient or surrogate, discussions with consultants, evaluation of patient's response to treatment, examination of patient, interpretation of cardiac output measurements, obtaining history from patient or surrogate, ordering and performing treatments and interventions, ordering and review of laboratory studies, ordering and review of radiographic studies, pulse oximetry, re-evaluation of patient's condition and review of old charts    I assumed direction of critical care for this patient from another provider in my specialty: no      Care discussed with: admitting provider              David Caicedo MD  01/15/24 0326

## 2024-01-15 NOTE — H&P
History Of Present Illness  Abdias Mike is a 89 y.o. male with pertinent hx MDS and declined treatment previously who presented to [facility] with sudden onset of stroke symptoms with last known well being 7 PM while the patient was eating ice cream with his wife.  Wife said the patient noted he felt a tingling to the right side of his face and she noticed he was having eyelid droop and right arm weakness.  She called 911 immediately and patient was immediately brought to the hospital.  Wife says that his mentation suddenly decreased upon arrival to the ER. Patient did receive fentanyl in the ER. CT of the brain showed evidence of a large intracranial/intraventricular hemorrhage with mass effect.  ER spoke with neurosurgery and the family including wife at the bedside who opted for comfort care measures.  At this time, family not interested in consulting hospice. Personally confirmed care plan & code status with family at bedside. Pertinent labs and workup in the ER included: Leukocytosis 21,000 with left shift, thrombocytopenia, chronic kidney disease with GFR 38 which is baseline, INR 1.2, blood sugar 126, EKG showed normal sinus rhythm.     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    Surgical History  He has a past surgical history that includes XR knee; Cataract extraction, bilateral; Hernia repair; and CT guided percutaneous biopsy bone deep (9/12/2023), knee replacement.     Social History  He reports that he has quit smoking over 50 yrs ago. His smoking use included cigarettes. He has never used smokeless tobacco. He reports current alcohol use. He reports that he does not use drugs.    Family History  Family History   Problem Relation Name Age of Onset    Other (diabetic, heart failure) Mother      Heart failure Father     Mom-stroke      Allergies  Amoxicillin, Atorvastatin, Oxycodone, Oxycodone-acetaminophen, and Penicillins    Review of Systems   Reason unable to perform ROS: limited and obtained from family since comatose.   Constitutional:  Positive for activity change and fatigue.   HENT:  Positive for drooling.    Respiratory:  Positive for shortness of breath. Negative for cough.    Gastrointestinal:  Negative for diarrhea, nausea and vomiting.   Skin:  Negative for rash and wound.   Neurological:  Positive for weakness and numbness.        +AMS; Right side weakness+   Hematological:  Bruises/bleeds easily.        +petechiae   Psychiatric/Behavioral:  Positive for confusion.      Physical Exam  Vitals reviewed.   Constitutional:       General: He is not in acute distress.     Appearance: He is normal weight. He is ill-appearing and toxic-appearing.      Comments: Comatose+   HENT:      Head: Normocephalic and atraumatic.      Mouth/Throat:      Mouth: Mucous membranes are moist.   Eyes:      General: No scleral icterus.     Conjunctiva/sclera: Conjunctivae normal.      Comments: Eyes closed and not assessed     Cardiovascular:      Rate and Rhythm: Normal rate.      Pulses: Normal pulses.      Heart sounds: Normal heart sounds.   Pulmonary:      Effort: Pulmonary effort is normal. No respiratory distress.      Breath sounds: Normal breath sounds. No wheezing, rhonchi or rales.   Abdominal:      General: Bowel sounds are normal. There is no distension.      Palpations: Abdomen is soft.      Tenderness: There is no abdominal tenderness. There is no guarding or rebound.   Musculoskeletal:         General: No swelling.      Right lower leg: No edema.      Left lower leg: No edema.   Lymphadenopathy:      Cervical: No cervical adenopathy.   Skin:     General: Skin is warm and dry.      Capillary Refill: Capillary refill takes more than 3 seconds.      Coloration: Skin is pale.   Neurological:      Mental Status: He is disoriented.      Comments:  +comatose; + facial droop:  GCS 3   Psychiatric:      Comments: Unable to assess in current mental state        Last Recorded Vitals  /52   Pulse 80   Temp 36.5 °C (97.7 °F) (Temporal)   Resp (!) 31   Wt 82.9 kg (182 lb 12.2 oz)   SpO2 (!) 83%     Relevant Results  Scheduled medications  [START ON 1/15/2024] scopolamine, 1 patch, transdermal, q72h      Continuous medications     PRN medications  PRN medications: haloperidol lactate, haloperidol lactate, HYDROmorphone, ketorolac, LORazepam, LORazepam, morphine, morphine    Results for orders placed or performed during the hospital encounter of 01/14/24 (from the past 24 hour(s))   CBC and Auto Differential   Result Value Ref Range    WBC 21.3 (H) 4.4 - 11.3 x10*3/uL    nRBC 0.0 0.0 - 0.0 /100 WBCs    RBC 3.85 (L) 4.50 - 5.90 x10*6/uL    Hemoglobin 11.2 (L) 13.5 - 17.5 g/dL    Hematocrit 35.2 (L) 41.0 - 52.0 %    MCV 91 80 - 100 fL    MCH 29.1 26.0 - 34.0 pg    MCHC 31.8 (L) 32.0 - 36.0 g/dL    RDW 15.0 (H) 11.5 - 14.5 %    Platelets 90 (L) 150 - 450 x10*3/uL    Immature Granulocytes %, Automated 6.2 (H) 0.0 - 0.9 %    Immature Granulocytes Absolute, Automated 1.32 (H) 0.00 - 0.50 x10*3/uL   Comprehensive metabolic panel   Result Value Ref Range    Glucose 116 (H) 74 - 99 mg/dL    Sodium 131 (L) 136 - 145 mmol/L    Potassium 3.5 3.5 - 5.3 mmol/L    Chloride 98 98 - 107 mmol/L    Bicarbonate 22 21 - 32 mmol/L    Anion Gap 15 10 - 20 mmol/L    Urea Nitrogen 30 (H) 6 - 23 mg/dL    Creatinine 1.68 (H) 0.50 - 1.30 mg/dL    eGFR 38 (L) >60 mL/min/1.73m*2    Calcium 8.7 8.6 - 10.3 mg/dL    Albumin 4.0 3.4 - 5.0 g/dL    Alkaline Phosphatase 65 33 - 136 U/L    Total Protein 7.3 6.4 - 8.2 g/dL    AST 22 9 - 39 U/L    Bilirubin, Total 0.5 0.0 - 1.2 mg/dL    ALT 12 10 - 52 U/L   Troponin I, High Sensitivity   Result Value Ref Range    Troponin I, High Sensitivity 9 0 - 20 ng/L   Protime-INR   Result Value Ref Range    Protime 13.1 (H) 9.8 - 12.8 seconds    INR 1.2 (H)  0.9 - 1.1   APTT   Result Value Ref Range    aPTT 31 27 - 38 seconds   Manual Differential   Result Value Ref Range    Neutrophils %, Manual 48.0 40.0 - 80.0 %    Bands %, Manual 3.0 0.0 - 5.0 %    Lymphocytes %, Manual 18.0 13.0 - 44.0 %    Monocytes %, Manual 26.0 2.0 - 10.0 %    Eosinophils %, Manual 1.0 0.0 - 6.0 %    Basophils %, Manual 0.0 0.0 - 2.0 %    Metamyelocytes %, Manual 3.0 0.0 - 0.0 %    Myelocytes %, Manual 1.0 0.0 - 0.0 %    Seg Neutrophils Absolute, Manual 10.22 (H) 1.60 - 5.00 x10*3/uL    Bands Absolute, Manual 0.64 (H) 0.00 - 0.50 x10*3/uL    Lymphocytes Absolute, Manual 3.83 (H) 0.80 - 3.00 x10*3/uL    Monocytes Absolute, Manual 5.54 (H) 0.05 - 0.80 x10*3/uL    Eosinophils Absolute, Manual 0.21 0.00 - 0.40 x10*3/uL    Basophils Absolute, Manual 0.00 0.00 - 0.10 x10*3/uL    Metamyelocytes Absolute, Manual 0.64 0.00 - 0.00 x10*3/uL    Myelocytes Absolute, Manual 0.21 0.00 - 0.00 x10*3/uL    Total Cells Counted 100     Neutrophils Absolute, Manual 10.86 (H) 1.60 - 5.50 x10*3/uL    RBC Morphology See Below     Polychromasia Mild    POCT GLUCOSE   Result Value Ref Range    POCT Glucose 126 (H) 74 - 99 mg/dL     CT angio brain attack head w IV contrast and post procedure    Result Date: 1/14/2024  Interpreted By:  Елена Mcdermott, STUDY: CT ANGIO BRAIN ATTACK NECK W IV CONTRAST AND POST PROCEDURE; CT ANGIO BRAIN ATTACK HEAD W IV CONTRAST AND POST PROCEDURE;  1/14/2024 7:43 pm   INDICATION: Signs/Symptoms:Stroke Evaluation with VAN Positive. Acute intracranial parenchymal hematoma.   COMPARISON: None. CT head 01/14/2024, CT chest 08/22/2022   ACCESSION NUMBER(S): [id number]; [id number]   ORDERING CLINICIAN: BENJAMIN MAYERIK   TECHNIQUE: Contrast was administered intravenously and axial images of the head and neck were acquired.  Coronal, sagittal, and 3-D reconstructions were provided for review.   FINDINGS: CT head without has been obtained and reported separately.   CTA HEAD FINDINGS: There are  several hyperdense foci in the right thalamus (series 402, image the [id number]).   Anterior circulation: Moderate atherosclerotic calcifications in carotid siphons, tortuosity the high-grade stenosis. Diminutive, asymmetric left A1 segment, likely developmental. Otherwise the bilateral intracranial internal carotid arteries, bilateral carotid terminals, bilateral proximal anterior and middle cerebral arteries are normal.   Posterior circulation: Diffusely diminutive left intradural vertebral artery with mild scattered atherosclerotic calcifications and distal tapering, likely developmental. The basilar artery appears anteriorly displaced of the level of the jaden with subtle flattening against the dorsal clivus (series 405, image 22). Findings likely related to previously described hematoma and mass effect. New significant basilar artery stenosis identified. Otherwise bilateral intracranial vertebral arteries, vertebrobasilar junction, basilar artery and proximal posterior cerebral arteries are normal.   CTA NECK FINDINGS:   Right carotid vessels: Atherosclerotic calcifications of the right common carotid artery, the carotid bulb and cervical ICA. There is 55% stenosis  by NASCET criteria.   Left carotid vessels: Atherosclerotic calcifications of the right common carotid artery, the carotid bulb and cervical ICA. There is 0% stenosis  by NASCET criteria.   Vertebral vessels:  The right vertebral artery appears dominant on a developmental basis. Calcifications near the origin right vertebral artery with mild narrowing. Otherwise the visualized segments of the cervical vertebral arteries are grossly patent.   Significant emphysematous changes in the visualized upper lung fields with biapical pleuroparenchymal scarring and interstitial opacities. Findings however appear progressed from 08/22/2022 clue Ding a 12 mm spiculated region in the left lung apex. Multiple additional pulmonary nodules again noted. Right parenchymal  coarse calcifications present.   Secretions noted upper trachea suggestive of aspiration       Parenchymal hematoma, interventricular hemorrhage and mass effect better delineated and described on concurrent CT head without contrast. Few hyperdense foci are noted within the hematoma which could represent traversing vessels or foci of extravasation site. No aneurysm identified. Mild mass effect suspected on the basilar artery as described without significant stenosis.   Atherosclerotic calcifications of the cervical vessels with moderate narrowing of the right ICA.   No evidence for significant stenosis or large branch vessel cutoffs of the intracranial vessels.   Emphysematous changes and biapical airspace opacities which may represent scarring however progressed from 08/22/2022 fluid in a slightly spiculated lesion measuring up to 12 mm. Follow-up on a nonemergent basis is suggested.   MACRO: None.   Signed by: Елена Mcdermott 1/14/2024 8:28 PM Dictation workstation:   [id number]    CT angio brain attack neck w IV contrast and post procedure    Result Date: 1/14/2024  Interpreted By:  Елена Mcdermott, STUDY: CT ANGIO BRAIN ATTACK NECK W IV CONTRAST AND POST PROCEDURE; CT ANGIO BRAIN ATTACK HEAD W IV CONTRAST AND POST PROCEDURE;  1/14/2024 7:43 pm   INDICATION: Signs/Symptoms:Stroke Evaluation with VAN Positive. Acute intracranial parenchymal hematoma.   COMPARISON: None. CT head 01/14/2024, CT chest 08/22/2022   ACCESSION NUMBER(S): [id number]; [id number]   ORDERING CLINICIAN: BENJAMIN AYALA   TECHNIQUE: Contrast was administered intravenously and axial images of the head and neck were acquired.  Coronal, sagittal, and 3-D reconstructions were provided for review.   FINDINGS: CT head without has been obtained and reported separately.   CTA HEAD FINDINGS: There are several hyperdense foci in the right thalamus (series 402, image the [id number]).   Anterior circulation: Moderate atherosclerotic calcifications in  carotid siphons, tortuosity the high-grade stenosis. Diminutive, asymmetric left A1 segment, likely developmental. Otherwise the bilateral intracranial internal carotid arteries, bilateral carotid terminals, bilateral proximal anterior and middle cerebral arteries are normal.   Posterior circulation: Diffusely diminutive left intradural vertebral artery with mild scattered atherosclerotic calcifications and distal tapering, likely developmental. The basilar artery appears anteriorly displaced of the level of the jaden with subtle flattening against the dorsal clivus (series 405, image 22). Findings likely related to previously described hematoma and mass effect. New significant basilar artery stenosis identified. Otherwise bilateral intracranial vertebral arteries, vertebrobasilar junction, basilar artery and proximal posterior cerebral arteries are normal.   CTA NECK FINDINGS:   Right carotid vessels: Atherosclerotic calcifications of the right common carotid artery, the carotid bulb and cervical ICA. There is 55% stenosis  by NASCET criteria.   Left carotid vessels: Atherosclerotic calcifications of the right common carotid artery, the carotid bulb and cervical ICA. There is 0% stenosis  by NASCET criteria.   Vertebral vessels:  The right vertebral artery appears dominant on a developmental basis. Calcifications near the origin right vertebral artery with mild narrowing. Otherwise the visualized segments of the cervical vertebral arteries are grossly patent.   Significant emphysematous changes in the visualized upper lung fields with biapical pleuroparenchymal scarring and interstitial opacities. Findings however appear progressed from 08/22/2022 clue Ding a 12 mm spiculated region in the left lung apex. Multiple additional pulmonary nodules again noted. Right parenchymal coarse calcifications present.   Secretions noted upper trachea suggestive of aspiration       Parenchymal hematoma, interventricular hemorrhage  and mass effect better delineated and described on concurrent CT head without contrast. Few hyperdense foci are noted within the hematoma which could represent traversing vessels or foci of extravasation site. No aneurysm identified. Mild mass effect suspected on the basilar artery as described without significant stenosis.   Atherosclerotic calcifications of the cervical vessels with moderate narrowing of the right ICA.   No evidence for significant stenosis or large branch vessel cutoffs of the intracranial vessels.   Emphysematous changes and biapical airspace opacities which may represent scarring however progressed from 08/22/2022 fluid in a slightly spiculated lesion measuring up to 12 mm. Follow-up on a nonemergent basis is suggested.   MACRO: None.   Signed by: Елена Mcdermott 1/14/2024 8:28 PM Dictation workstation:   [id number]    CT brain attack head wo IV contrast    Result Date: 1/14/2024  Interpreted By:  Елена Mcdermott, STUDY: CT BRAIN ATTACK HEAD WO IV CONTRAST;  1/14/2024 7:39 pm   INDICATION: Signs/Symptoms:Stroke Evaluation.   COMPARISON: None.   ACCESSION NUMBER(S): [id number]   ORDERING CLINICIAN: BENJAMIN AYALA   TECHNIQUE: Axial noncontrast CT images of the head.   FINDINGS: BRAIN PARENCHYMA: There is a acute parenchymal hemorrhage which involves the right basal ganglia, thalamus, midbrain and minimally extends into the dorsal right jaden measuring up to 3.6 x 3.2 by 4.2 cm (oblique AP by T by CC). There is adjacent hypodensity suggestive of associated parenchymal edema. Mass effect is noted with midline shift measuring up to 8-9 mm to the left at the level of the 3rd ventricle. Extension of hemorrhage into the ventricular system noted most prominent in the right greater than left lateral ventricle as well as extending into the 3rd and 4th ventricle as well as the sylvian aqueduct. There is resultant obstructive hydrocephalus with expansion of the right greater than left lateral ventricles  "likely secondary to mass effect at the level of the inferior 3rd ventricle and superior sylvian aqueduct.   Generalized parenchymal volume loss noted. Non-specific white matter changes noted, which may be related to small vessel disease. Calcifications of the carotid siphons.   HEMORRHAGE: As above. VENTRICLES and EXTRA-AXIAL SPACES: As above. EXTRACRANIAL SOFT TISSUES: Within normal limits. PARANASAL SINUSES/MASTOIDS: Polyps or mucous retention cysts in the right lateral ventricle, otherwise the visualized paranasal sinuses and mastoid air cells are aerated. CALVARIUM: No depressed skull fracture. No destructive osseous lesion.   OTHER FINDINGS: None.       Acute parenchymal hemorrhage measuring up to 4.2 cm involving the right basal ganglia, thalamus, midbrain and jaden with adjacent edema and mass effect as well as intraventricular hemorrhage. Findings results in the 8-9 mm leftward midline shift and mass effect resulting in obstructive hydrocephalus. Urgent neurosurgical consult recommended.     MACRO: Елена Mcdermott discussed the significance and urgency of this critical finding by telephone with  BENJAMIN AYALA on 1/14/2024 at 7:50 pm.  (**-RCF-**) Findings:  See findings.   Signed by: Елена Mcdermott 1/14/2024 7:59 PM Dictation workstation:   [id number]     Assessment/Plan   Principal Problem:  Acute Intracranial Parenchymal Hematoma/Intraventricular Hemorrhage    CT Angio Brain showed \"Parenchymal hematoma, interventricular hemorrhage and mass effect better delineated and described on concurrent CT head without contrast. Few hyperdense foci are noted within the hematoma which could represent traversing vessels or foci of extravasation site\"  CT brain attack showed \"There is a acute parenchymal hemorrhage which  involves the right basal ganglia, thalamus, midbrain and minimally  extends into the dorsal right jaden measuring up to 3.6 x 3.2 by 4.2  cm (oblique AP by T by CC). There is adjacent hypodensity " "suggestive  of associated parenchymal edema. Mass effect is noted with midline  shift measuring up to 8-9 mm to the left at the level of the 3rd  ventricle. Extension of hemorrhage into the ventricular system noted  most prominent in the right greater than left lateral ventricle as  well as extending into the 3rd and 4th ventricle as well as the  sylvian aqueduct. There is resultant obstructive hydrocephalus with  expansion of the right greater than left lateral ventricles likely  secondary to mass effect at the level of the inferior 3rd ventricle  and superior sylvian aqueduct.\"  ER spoke with neurosurgery and family including wife at bedside who opted for comfort care   Family not interested in Hospice at this time  Mejias for end of life care measures    Comatose  Supportive care  Haldol for delirium    Hypoxic Respiratory Failure  2 Liters NC  Comfort Measures  Dilaudid for dyspnea  NPO    Pain  Morphine PRN    Excessive Secretions  Atropine drops, scopolamine    Restlessness/Seizures  Ativan    DNR-Comfort Care    DVT PX  SCDs    Janis Wagner, APRN-CNP    "

## 2024-01-15 NOTE — PROGRESS NOTES
01/15/2024 1229pm  Made aware patient family to have a meeting with [facility] today. Met with patient's wife and family bedside. Patient in bed apparently sleeping on 2L O2 at present - no responsive, but deep breathing. Wife is open to Hospice but not hospice house in [place]. [facility] but wife lives in [place]. If facility is needed, [facility] option and [facility] if service connected.

## 2024-01-15 NOTE — CONSULTS
PALLIATIVE MEDICINE CONSULT   Attending Physician: Cory Obando MD  Reason For Consult: Assistance with determining goals of care, complex medical decision making, code status discussion, and symptom management    INTRODUCTIONS   Patient's capacity: Has good decision making capacity  Family present: Wife, Sister, and SILx2  Additional staff present: None  Service: Palliative care was introduced as a resource for patients with serious illness to help with symptoms, assist with goals of care conversations, navigate complex decision making, improve quality of life for patients, and provide support to patients and families. Support and empathy was provided throughout the encounter.    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 Testing in the ED confirmed  brain hemorrage. The family opted to have comfort care for pt. The pt was admitted for EOL care. Palliative Medicine was consulted to assist w the comfort care and coordination of other supportive care.     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    Past Surgical History   has a past surgical history that includes XR knee; Cataract extraction, bilateral; Hernia repair; and CT guided percutaneous biopsy bone deep (9/12/2023).    Family Medical History  Family History   Problem Relation Name Age of Onset    Other (diabetic, heart failure) Mother      Heart failure Father         Home Medications  Reviewed    Allergies  Allergies   Allergen Reactions    Amoxicillin Unknown    Atorvastatin Myalgia    Oxycodone Unknown    Oxycodone-Acetaminophen Hallucinations    Penicillins Hives       Social History   from home. Former smoker , some alcohol use, no  "drug use     Mu-ism and Spirituality:  Roman Catholic      ADVANCED CARE PLANNING AND COUNSELING   Serious Illness Perception   Perspective: spouse:    Impression of disease and stage: End of life stage  Prognosis:poor  Concerns about the future: denies  Hopes:comfort  Rating of family knowledge about pt's disease, priorities and wishes: Excellent    Advanced Directives/Proxy  No docs  Wife POA by kervin     Emergency Contact Information  Extended Emergency Contact Information  Primary Emergency Contact: Disha Mike  Home Phone: [phone]  Work Phone: [phone]  Relation: Spouse      Counseling Provided  Counseling provided on the current clinical condition, including diagnosis, prognosis, management strategies for comfort.  Emphasized the importance of advanced directives and provided education on what the documents entail.   Provided education on hospice support services here at [facility] and also at a facility if pt requires discharge.  Reviewed code status of DNRCC inclusions  Family verbalized understanding of this information and that pt may pass anytime going forward.     OBJECTIVE   Inpatient Medications  atropine, 2 drop, sublingual, q4h  scopolamine, 1 patch, transdermal, q72h    Continuous medications     PRN medications  PRN medications: haloperidol lactate, haloperidol lactate, HYDROmorphone, ketorolac, LORazepam, LORazepam, morphine, morphine, oxygen     Last Recorded Vitals  Blood pressure 121/50, pulse 74, temperature 36.6 °C (97.9 °F), temperature source Temporal, resp. rate 24, height 1.753 m (5' 9\"), weight 82.9 kg (182 lb 12.2 oz), SpO2 95 %.  7 Day Weight Change: Unable to Calculate   Body mass index is 26.99 kg/m².     Relevant Results  Lab Results   Component Value Date    WBC 21.3 (H) 01/14/2024    HGB 11.2 (L) 01/14/2024    HCT 35.2 (L) 01/14/2024    MCV 91 01/14/2024    PLT 90 (L) 01/14/2024      Lab Results   Component Value Date    GLUCOSE 116 (H) 01/14/2024    CALCIUM 8.7 " 01/14/2024     (L) 01/14/2024    K 3.5 01/14/2024    CO2 22 01/14/2024    CL 98 01/14/2024    BUN 30 (H) 01/14/2024    CREATININE 1.68 (H) 01/14/2024      Lab Results   Component Value Date    ALT 12 01/14/2024    AST 22 01/14/2024    ALKPHOS 65 01/14/2024    BILITOT 0.5 01/14/2024      Lab Results   Component Value Date    ALBUMIN 4.0 01/14/2024      Serum creatinine: 1.68 mg/dL (H) 01/14/24 1957  Estimated creatinine clearance: 28.1 mL/min (A)     Relevant Imaging  CT angio brain attack head w IV contrast and post procedure    Result Date: 1/14/2024  Interpreted By:  Елена Mcdermott, STUDY: CT ANGIO BRAIN ATTACK NECK W IV CONTRAST AND POST PROCEDURE; CT ANGIO BRAIN ATTACK HEAD W IV CONTRAST AND POST PROCEDURE;  1/14/2024 7:43 pm   INDICATION: Signs/Symptoms:Stroke Evaluation with VAN Positive. Acute intracranial parenchymal hematoma.   COMPARISON: None. CT head 01/14/2024, CT chest 08/22/2022   ACCESSION NUMBER(S): [id number]; [id number]   ORDERING CLINICIAN: BENJAMIN AYALA   TECHNIQUE: Contrast was administered intravenously and axial images of the head and neck were acquired.  Coronal, sagittal, and 3-D reconstructions were provided for review.   FINDINGS: CT head without has been obtained and reported separately.   CTA HEAD FINDINGS: There are several hyperdense foci in the right thalamus (series 402, image the [id number]).   Anterior circulation: Moderate atherosclerotic calcifications in carotid siphons, tortuosity the high-grade stenosis. Diminutive, asymmetric left A1 segment, likely developmental. Otherwise the bilateral intracranial internal carotid arteries, bilateral carotid terminals, bilateral proximal anterior and middle cerebral arteries are normal.   Posterior circulation: Diffusely diminutive left intradural vertebral artery with mild scattered atherosclerotic calcifications and distal tapering, likely developmental. The basilar artery appears anteriorly displaced of the level of the jaden  with subtle flattening against the dorsal clivus (series 405, image 22). Findings likely related to previously described hematoma and mass effect. New significant basilar artery stenosis identified. Otherwise bilateral intracranial vertebral arteries, vertebrobasilar junction, basilar artery and proximal posterior cerebral arteries are normal.   CTA NECK FINDINGS:   Right carotid vessels: Atherosclerotic calcifications of the right common carotid artery, the carotid bulb and cervical ICA. There is 55% stenosis  by NASCET criteria.   Left carotid vessels: Atherosclerotic calcifications of the right common carotid artery, the carotid bulb and cervical ICA. There is 0% stenosis  by NASCET criteria.   Vertebral vessels:  The right vertebral artery appears dominant on a developmental basis. Calcifications near the origin right vertebral artery with mild narrowing. Otherwise the visualized segments of the cervical vertebral arteries are grossly patent.   Significant emphysematous changes in the visualized upper lung fields with biapical pleuroparenchymal scarring and interstitial opacities. Findings however appear progressed from 08/22/2022 clue Ding a 12 mm spiculated region in the left lung apex. Multiple additional pulmonary nodules again noted. Right parenchymal coarse calcifications present.   Secretions noted upper trachea suggestive of aspiration       Parenchymal hematoma, interventricular hemorrhage and mass effect better delineated and described on concurrent CT head without contrast. Few hyperdense foci are noted within the hematoma which could represent traversing vessels or foci of extravasation site. No aneurysm identified. Mild mass effect suspected on the basilar artery as described without significant stenosis.   Atherosclerotic calcifications of the cervical vessels with moderate narrowing of the right ICA.   No evidence for significant stenosis or large branch vessel cutoffs of the intracranial vessels.    Emphysematous changes and biapical airspace opacities which may represent scarring however progressed from 08/22/2022 fluid in a slightly spiculated lesion measuring up to 12 mm. Follow-up on a nonemergent basis is suggested.   MACRO: None.   Signed by: Елена Mcdermott 1/14/2024 8:28 PM Dictation workstation:   [id number]    CT angio brain attack neck w IV contrast and post procedure    Result Date: 1/14/2024  Interpreted By:  Елена Mcdermott, STUDY: CT ANGIO BRAIN ATTACK NECK W IV CONTRAST AND POST PROCEDURE; CT ANGIO BRAIN ATTACK HEAD W IV CONTRAST AND POST PROCEDURE;  1/14/2024 7:43 pm   INDICATION: Signs/Symptoms:Stroke Evaluation with VAN Positive. Acute intracranial parenchymal hematoma.   COMPARISON: None. CT head 01/14/2024, CT chest 08/22/2022   ACCESSION NUMBER(S): [id number]; [id number]   ORDERING CLINICIAN: BENJAMIN AYALA   TECHNIQUE: Contrast was administered intravenously and axial images of the head and neck were acquired.  Coronal, sagittal, and 3-D reconstructions were provided for review.   FINDINGS: CT head without has been obtained and reported separately.   CTA HEAD FINDINGS: There are several hyperdense foci in the right thalamus (series 402, image the [id number]).   Anterior circulation: Moderate atherosclerotic calcifications in carotid siphons, tortuosity the high-grade stenosis. Diminutive, asymmetric left A1 segment, likely developmental. Otherwise the bilateral intracranial internal carotid arteries, bilateral carotid terminals, bilateral proximal anterior and middle cerebral arteries are normal.   Posterior circulation: Diffusely diminutive left intradural vertebral artery with mild scattered atherosclerotic calcifications and distal tapering, likely developmental. The basilar artery appears anteriorly displaced of the level of the jaden with subtle flattening against the dorsal clivus (series 405, image 22). Findings likely related to previously described hematoma and mass effect. New  significant basilar artery stenosis identified. Otherwise bilateral intracranial vertebral arteries, vertebrobasilar junction, basilar artery and proximal posterior cerebral arteries are normal.   CTA NECK FINDINGS:   Right carotid vessels: Atherosclerotic calcifications of the right common carotid artery, the carotid bulb and cervical ICA. There is 55% stenosis  by NASCET criteria.   Left carotid vessels: Atherosclerotic calcifications of the right common carotid artery, the carotid bulb and cervical ICA. There is 0% stenosis  by NASCET criteria.   Vertebral vessels:  The right vertebral artery appears dominant on a developmental basis. Calcifications near the origin right vertebral artery with mild narrowing. Otherwise the visualized segments of the cervical vertebral arteries are grossly patent.   Significant emphysematous changes in the visualized upper lung fields with biapical pleuroparenchymal scarring and interstitial opacities. Findings however appear progressed from 08/22/2022 clue Ding a 12 mm spiculated region in the left lung apex. Multiple additional pulmonary nodules again noted. Right parenchymal coarse calcifications present.   Secretions noted upper trachea suggestive of aspiration       Parenchymal hematoma, interventricular hemorrhage and mass effect better delineated and described on concurrent CT head without contrast. Few hyperdense foci are noted within the hematoma which could represent traversing vessels or foci of extravasation site. No aneurysm identified. Mild mass effect suspected on the basilar artery as described without significant stenosis.   Atherosclerotic calcifications of the cervical vessels with moderate narrowing of the right ICA.   No evidence for significant stenosis or large branch vessel cutoffs of the intracranial vessels.   Emphysematous changes and biapical airspace opacities which may represent scarring however progressed from 08/22/2022 fluid in a slightly spiculated  lesion measuring up to 12 mm. Follow-up on a nonemergent basis is suggested.   MACRO: None.   Signed by: Елена Mcdermott 1/14/2024 8:28 PM Dictation workstation:   [id number]    CT brain attack head wo IV contrast    Result Date: 1/14/2024  Interpreted By:  Елена Mcdermott, STUDY: CT BRAIN ATTACK HEAD WO IV CONTRAST;  1/14/2024 7:39 pm   INDICATION: Signs/Symptoms:Stroke Evaluation.   COMPARISON: None.   ACCESSION NUMBER(S): [id number]   ORDERING CLINICIAN: BENJAMIN AYALA   TECHNIQUE: Axial noncontrast CT images of the head.   FINDINGS: BRAIN PARENCHYMA: There is a acute parenchymal hemorrhage which involves the right basal ganglia, thalamus, midbrain and minimally extends into the dorsal right jaden measuring up to 3.6 x 3.2 by 4.2 cm (oblique AP by T by CC). There is adjacent hypodensity suggestive of associated parenchymal edema. Mass effect is noted with midline shift measuring up to 8-9 mm to the left at the level of the 3rd ventricle. Extension of hemorrhage into the ventricular system noted most prominent in the right greater than left lateral ventricle as well as extending into the 3rd and 4th ventricle as well as the sylvian aqueduct. There is resultant obstructive hydrocephalus with expansion of the right greater than left lateral ventricles likely secondary to mass effect at the level of the inferior 3rd ventricle and superior sylvian aqueduct.   Generalized parenchymal volume loss noted. Non-specific white matter changes noted, which may be related to small vessel disease. Calcifications of the carotid siphons.   HEMORRHAGE: As above. VENTRICLES and EXTRA-AXIAL SPACES: As above. EXTRACRANIAL SOFT TISSUES: Within normal limits. PARANASAL SINUSES/MASTOIDS: Polyps or mucous retention cysts in the right lateral ventricle, otherwise the visualized paranasal sinuses and mastoid air cells are aerated. CALVARIUM: No depressed skull fracture. No destructive osseous lesion.   OTHER FINDINGS: None.       Acute  parenchymal hemorrhage measuring up to 4.2 cm involving the right basal ganglia, thalamus, midbrain and jaden with adjacent edema and mass effect as well as intraventricular hemorrhage. Findings results in the 8-9 mm leftward midline shift and mass effect resulting in obstructive hydrocephalus. Urgent neurosurgical consult recommended.     MACRO: Елена Mcdermott discussed the significance and urgency of this critical finding by telephone with  BENJAMIN AYALA on 1/14/2024 at 7:50 pm.  (**-RCF-**) Findings:  See findings.   Signed by: Елена Mcdermott 1/14/2024 7:59 PM Dictation workstation:   [id number]       Physical Exam  General:  appears to be actively dying, frail, elderly man, in distress of breathing, moaning intermittently  Skin:  Warm and dry. No cyanosis or mottling  HEENT: mmd  Respiratory: tachypnea, labored breathing, on a few liters of 02  resps 30/min  Cardiovascular:  No JVD  Abdomen: Flat  Urologic: bloody urine in cath bag  Musculoskeletal:   Neurologic:  comatose  Mental status:  ccomatose, no restlessness noted      ASSESSMENT AND PLAN   Impression  This is an actively dying 89 year old with PMH ofMDS  who had a hemmorhagic stroke, comatose, now on comfort care.    Goals of Care: Goals of Care: comfort is paramount; other goals are not relevant or achievable  Code Status: DNRCC  Prognosis: days to week(s)  Hospice Eligibility: Yes, Diagnosis hemmorhagic stroke, comatose    Plan:  Continue supportive care  Symptom mgmt:   Dyspnea: Oxygen, Morphine 2mg IV given, but this wore off in less than 30 min. Morphine 4mg x1 now. If this is not effective, will need drip.   Other anticipated windy EOL medication orders placed  Hospice consulted: [facility]; would not want to got to hospice house  Discharge/ Social Considerations: [facility]ing [facility] if DC plan needed. [facility].     Thank you for asking Palliative Care to assist with  care of this patient.  We will continue to follow  Please contact us for additional questions or concerns.    Update provided to: Attending Dr. Obando, SELENE Schrader, BEATRIZ, and SOLITARIO Arellano    MEDICAL COMPLEXITY      I spent 40 minutes in the care of this patient which included chart review, interviewing patient/family, discussion with primary team, coordination of care, and documentation.    CONTACT INFORMATION   Teresa Roa CNP  Palliative Medicine  [facility] Secure chat

## 2024-01-15 NOTE — PROGRESS NOTES
Patient: Abdias Mike  Room/bed: 125/125-A  Admitted on: 1/14/2024    Age: 89 y.o.   Gender: male  Code Status:  DNR Comfort Measures Only   Admitting Dx: Stroke-like symptoms [R29.90]    MRN: [id number]  PCP: Rafael Todd DO       Subjective   Seen and examined in his room this AM. Wife is present. Patient is unresponsive. On 2 liters oxygen. Breathing is mildly fast but otherwise does not appear to be in acute distress or pain at this time.     Objective    Physical Exam   Constitutional: Asleep/Sedate  Eyes: Closed.   HEENT: Normocephalic, Atraumatic. Oral mucosa moist.   Neck: Supple. No JVD, lymphadenopathy.   Lungs: CTAB with some rhonchi anteriorly. Mild tachypnea on supplemental oxygen.   Heart: RRR  Abdomen: Soft, non-tender, non-distended, +BS  : Mejias with hematuria  MS/Extremities: No purposeful movements  Neuro: Sedate  Skin: Warm and dry. No rashes or lesions  Psych: Unable to assess    Temp:  [36.5 °C (97.7 °F)-37.5 °C (99.5 °F)] 37.5 °C (99.5 °F)  Heart Rate:  [] 100  Resp:  [20-31] 30  BP: (121-175)/() 121/50    Vitals:    01/14/24 1929   Weight: 82.9 kg (182 lb 12.2 oz)     I/Os    Intake/Output Summary (Last 24 hours) at 1/15/2024 1451  Last data filed at 1/15/2024 1300  Gross per 24 hour   Intake --   Output 2600 ml   Net -2600 ml       Labs:   Results from last 72 hours   Lab Units 01/14/24 1957   SODIUM mmol/L 131*   POTASSIUM mmol/L 3.5   CHLORIDE mmol/L 98   CO2 mmol/L 22   BUN mg/dL 30*   CREATININE mg/dL 1.68*   GLUCOSE mg/dL 116*   CALCIUM mg/dL 8.7   ANION GAP mmol/L 15   EGFR mL/min/1.73m*2 38*      Results from last 72 hours   Lab Units 01/14/24 1957   WBC AUTO x10*3/uL 21.3*   HEMOGLOBIN g/dL 11.2*   HEMATOCRIT % 35.2*   PLATELETS AUTO x10*3/uL 90*   LYMPHO PCT MAN % 18.0   MONO PCT MAN % 26.0   EOSINO PCT MAN % 1.0      Lab Results   Component Value Date    CALCIUM 8.7 01/14/2024      Lab Results   Component Value Date    CRP 0.90 08/11/2022      Micro/ID:   No  results found for the last 90 days.    Images:  CT angio brain attack head w IV contrast and post procedure, CT angio brain attack neck w IV contrast and post procedure  Impression: Parenchymal hematoma, interventricular hemorrhage and mass effect  better delineated and described on concurrent CT head without  contrast. Few hyperdense foci are noted within the hematoma which  could represent traversing vessels or foci of extravasation site. No  aneurysm identified. Mild mass effect suspected on the basilar artery  as described without significant stenosis.      Atherosclerotic calcifications of the cervical vessels with moderate  narrowing of the right ICA.      No evidence for significant stenosis or large branch vessel cutoffs  of the intracranial vessels.      Emphysematous changes and biapical airspace opacities which may  represent scarring however progressed from 08/22/2022 fluid in a  slightly spiculated lesion measuring up to 12 mm. Follow-up on a  nonemergent basis is suggested.      Assessment and Plan    Abdias Mike is a 89 y.o. male with a medical history of MDS (opted not to seek treatment) who presented to [facility] ED with acute stroke-like symptoms. CT Head demonstrated hemorrhagic stroke. Wife opted for comfort care.     Acute Hemorrhagic CVA  -Placed on comfort measures  -Palliative APRN consulted and will be placing a consult to Hospice (wife does not want Hospice House)  -Maintain comfort measures: Haldol, Dilaudid, Ketorolac, Ativan, Atropine, Scopolamine; Morphine gtt initiated this afternoon  -Oxygen for comfort  -Consult was placed to Hospice and family is considering local LTC facilities.   -Emotional support given.    Disposition  -Plan of care discussed with medicine attending, Dr. Obando, Palliative Care Team.  -Discharge plan as noted above.   -Maintain code status of DNR-Comfort Care.     Vanita Pineda, APRN-CNP

## 2024-01-16 NOTE — NURSING NOTE
1930: assumed pt care    2145: pt's wife came out in disla states her  stopped breathing, no pulse. Let Janis Wagner NP and Dr. Barraza know. Turned off IV morphine PCA pump.     2204: pt is pronounced passed away at this time

## 2024-01-16 NOTE — SIGNIFICANT EVENT
Called to assess patient for unresponsiveness at time of death.   On exam, patient did not respond to verbal or physical stimuli.  Breathing ceased.   Heart beat and pulse are absent.   Pupils are fixed and dilated.   Dr. Barraza notified.  Wife at bedside to witness.  Autopsy declined.

## 2024-01-24 NOTE — DISCHARGE SUMMARY
Discharge Diagnosis  Hemorrhagic stroke      Discharge Meds     Your medication list        ASK your doctor about these medications        Instructions Last Dose Given Next Dose Due   Move Free ON [employer] 750 mg-100 mg- 1.65 mg-108 mg tablet  Generic drug: glucosam-chond-hyalu-CF borate           Ocuvite Adult 50 Plus 250 mg (90 mg-160 mg) capsule  Generic drug: wq-qh-xk7-dha-epa-fish-lut-sancho           simvastatin 20 mg tablet  Commonly known as: Zocor           tamsulosin 0.4 mg 24 hr capsule  Commonly known as: Flomax                    Test Results Pending At Discharge  Pending Labs       No current pending labs.            Hospital Course   Abdias Mike is a 89 y.o. male with a medical history of MDS (opted not to seek treatment) who presented to [facility] ED on 1/14/2024 with acute onset of  tingling to the right side of his face and eyelid droop and right arm weakness that progressed to unresponsiveness. CT Head demonstrated a large intracranial/intraventricular hemorrhage with mass effect. Wife opted for comfort care. He passed away on 1/15/2024 at 2204.     Outpatient Follow-Up  No future appointments.      Cory Obando MD

## 2024-07-09 ENCOUNTER — APPOINTMENT (OUTPATIENT)
Dept: HEMATOLOGY/ONCOLOGY | Facility: CLINIC | Age: 89
End: 2024-07-09
Payer: MEDICARE